# Patient Record
Sex: FEMALE | NOT HISPANIC OR LATINO | URBAN - METROPOLITAN AREA
[De-identification: names, ages, dates, MRNs, and addresses within clinical notes are randomized per-mention and may not be internally consistent; named-entity substitution may affect disease eponyms.]

---

## 2017-09-18 ENCOUNTER — INPATIENT (INPATIENT)
Facility: HOSPITAL | Age: 22
LOS: 7 days | Discharge: HOME | DRG: 871 | End: 2017-09-26
Attending: HOSPITALIST | Admitting: HOSPITALIST
Payer: COMMERCIAL

## 2017-09-18 VITALS
OXYGEN SATURATION: 98 % | WEIGHT: 110.01 LBS | TEMPERATURE: 101 F | SYSTOLIC BLOOD PRESSURE: 110 MMHG | DIASTOLIC BLOOD PRESSURE: 75 MMHG | HEART RATE: 128 BPM

## 2017-09-18 LAB
ALBUMIN SERPL ELPH-MCNC: 4.4 G/DL — SIGNIFICANT CHANGE UP (ref 3.3–5)
ALP SERPL-CCNC: 34 U/L — LOW (ref 40–120)
ALT FLD-CCNC: 29 U/L RC — SIGNIFICANT CHANGE UP (ref 10–45)
ANION GAP SERPL CALC-SCNC: 17 MMOL/L — SIGNIFICANT CHANGE UP (ref 5–17)
APPEARANCE UR: CLEAR — SIGNIFICANT CHANGE UP
AST SERPL-CCNC: 21 U/L — SIGNIFICANT CHANGE UP (ref 10–40)
BASOPHILS # BLD AUTO: 0 K/UL — SIGNIFICANT CHANGE UP (ref 0–0.2)
BASOPHILS NFR BLD AUTO: 0.1 % — SIGNIFICANT CHANGE UP (ref 0–2)
BILIRUB SERPL-MCNC: 0.4 MG/DL — SIGNIFICANT CHANGE UP (ref 0.2–1.2)
BILIRUB UR-MCNC: NEGATIVE — SIGNIFICANT CHANGE UP
BUN SERPL-MCNC: 8 MG/DL — SIGNIFICANT CHANGE UP (ref 7–23)
CALCIUM SERPL-MCNC: 9.6 MG/DL — SIGNIFICANT CHANGE UP (ref 8.4–10.5)
CHLORIDE SERPL-SCNC: 101 MMOL/L — SIGNIFICANT CHANGE UP (ref 96–108)
CO2 SERPL-SCNC: 21 MMOL/L — LOW (ref 22–31)
COLOR SPEC: SIGNIFICANT CHANGE UP
CREAT SERPL-MCNC: 0.7 MG/DL — SIGNIFICANT CHANGE UP (ref 0.5–1.3)
DIFF PNL FLD: ABNORMAL
EOSINOPHIL # BLD AUTO: 0 K/UL — SIGNIFICANT CHANGE UP (ref 0–0.5)
EOSINOPHIL NFR BLD AUTO: 0 % — SIGNIFICANT CHANGE UP (ref 0–6)
GLUCOSE SERPL-MCNC: 115 MG/DL — HIGH (ref 70–99)
GLUCOSE UR QL: NEGATIVE — SIGNIFICANT CHANGE UP
HCT VFR BLD CALC: 40.9 % — SIGNIFICANT CHANGE UP (ref 34.5–45)
HGB BLD-MCNC: 14 G/DL — SIGNIFICANT CHANGE UP (ref 11.5–15.5)
KETONES UR-MCNC: ABNORMAL
LEUKOCYTE ESTERASE UR-ACNC: NEGATIVE — SIGNIFICANT CHANGE UP
LYMPHOCYTES # BLD AUTO: 1 K/UL — SIGNIFICANT CHANGE UP (ref 1–3.3)
LYMPHOCYTES # BLD AUTO: 6.4 % — LOW (ref 13–44)
MCHC RBC-ENTMCNC: 33.7 PG — SIGNIFICANT CHANGE UP (ref 27–34)
MCHC RBC-ENTMCNC: 34.4 GM/DL — SIGNIFICANT CHANGE UP (ref 32–36)
MCV RBC AUTO: 98.2 FL — SIGNIFICANT CHANGE UP (ref 80–100)
MONOCYTES # BLD AUTO: 1.2 K/UL — HIGH (ref 0–0.9)
MONOCYTES NFR BLD AUTO: 7.2 % — SIGNIFICANT CHANGE UP (ref 2–14)
NEUTROPHILS # BLD AUTO: 13.8 K/UL — HIGH (ref 1.8–7.4)
NEUTROPHILS NFR BLD AUTO: 86.3 % — HIGH (ref 43–77)
NITRITE UR-MCNC: NEGATIVE — SIGNIFICANT CHANGE UP
PH UR: 6.5 — SIGNIFICANT CHANGE UP (ref 5–8)
PLATELET # BLD AUTO: 366 K/UL — SIGNIFICANT CHANGE UP (ref 150–400)
POTASSIUM SERPL-MCNC: 3.9 MMOL/L — SIGNIFICANT CHANGE UP (ref 3.5–5.3)
POTASSIUM SERPL-SCNC: 3.9 MMOL/L — SIGNIFICANT CHANGE UP (ref 3.5–5.3)
PROT SERPL-MCNC: 7.9 G/DL — SIGNIFICANT CHANGE UP (ref 6–8.3)
PROT UR-MCNC: NEGATIVE — SIGNIFICANT CHANGE UP
RAPID RVP RESULT: SIGNIFICANT CHANGE UP
RBC # BLD: 4.16 M/UL — SIGNIFICANT CHANGE UP (ref 3.8–5.2)
RBC # FLD: 11 % — SIGNIFICANT CHANGE UP (ref 10.3–14.5)
SODIUM SERPL-SCNC: 139 MMOL/L — SIGNIFICANT CHANGE UP (ref 135–145)
SP GR SPEC: 1.01 — SIGNIFICANT CHANGE UP (ref 1.01–1.02)
UROBILINOGEN FLD QL: NEGATIVE — SIGNIFICANT CHANGE UP
WBC # BLD: 16 K/UL — HIGH (ref 3.8–10.5)
WBC # FLD AUTO: 16 K/UL — HIGH (ref 3.8–10.5)

## 2017-09-18 PROCEDURE — 62270 DX LMBR SPI PNXR: CPT

## 2017-09-18 PROCEDURE — 99285 EMERGENCY DEPT VISIT HI MDM: CPT | Mod: 25

## 2017-09-18 RX ORDER — KETOROLAC TROMETHAMINE 30 MG/ML
30 SYRINGE (ML) INJECTION ONCE
Qty: 0 | Refills: 0 | Status: DISCONTINUED | OUTPATIENT
Start: 2017-09-18 | End: 2017-09-18

## 2017-09-18 RX ORDER — SODIUM CHLORIDE 9 MG/ML
1000 INJECTION INTRAMUSCULAR; INTRAVENOUS; SUBCUTANEOUS ONCE
Qty: 0 | Refills: 0 | Status: COMPLETED | OUTPATIENT
Start: 2017-09-18 | End: 2017-09-18

## 2017-09-18 RX ADMIN — SODIUM CHLORIDE 1000 MILLILITER(S): 9 INJECTION INTRAMUSCULAR; INTRAVENOUS; SUBCUTANEOUS at 21:56

## 2017-09-18 RX ADMIN — Medication 30 MILLIGRAM(S): at 23:52

## 2017-09-18 NOTE — ED PROVIDER NOTE - PROGRESS NOTE DETAILS
CSF concerning for bacterial meningitis. Patient well appearing. Discussed with patient and family regarding antibiotics. Father concerning about side effects of antibiotics, primarily c.diff. Discussed at length regarding risks/benefits including permanent disability or death from meningitis, patient and family still deciding on antibiotics. patient and family agreeable to antibiotics after speaking to Dr. Haynes. Also consulted neuro given patient and family concerns.

## 2017-09-18 NOTE — ED PROVIDER NOTE - OBJECTIVE STATEMENT
20 yo F with PMH of migraines treated with Excedrin as needed presenting with 1 day of occipital throbbing HA different from migraines in past and subjective fever. Also complaining of bilateral neck pain and lower back pain. Took ibuprofen at 5 pm today with no relief. Went to urgent care where she was given Tylenol around 7 pm with some improvement in symptoms, but sent here for concern for meningitis. Endorses minimal photophobia. Able to range neck but endorses pain with movement. 20 yo F with PMH of migraines treated with Excedrin as needed presenting with 1 day of occipital throbbing HA different from migraines in past and subjective fever (did not take temp at home). MONTES DE OCA started last night with gradual worsening throughout today. Also complaining of bilateral neck pain and lower back pain. Took ibuprofen at 5 pm today with no relief. Went to urgent care where she was given Tylenol around 7 pm for fever 102 with some improvement in symptoms, but sent here for concern for meningitis. Endorses minimal photophobia. Able to range neck but endorses pain with movement. No vision changes, nasal congestion, ear pain, sore throat, cough, CP, SOB, abd pain, N/V/D, dysuria, rashes, joint pain. LMP 2 weeks ago.

## 2017-09-18 NOTE — ED ADULT NURSE NOTE - OBJECTIVE STATEMENT
22 yo female presents to the ED from home c/o HA, stiff neck and fever starting last night. patient states she woke up with stiff neck this AM and has been constant since with HA at the sides and back of head 5/10 satinet has full ROM of neck. she states she went to [facility] today with fever of 102 and was given 2 tylenol. patient 99.4 temp orally in ED, VK=203. patient denies chest pain, SOB, N/V/D, dizziness cough. recent travel to [place] and [place] 2 weeks ago. MD at the bedside. patient AAOx4. lung sounds clear bilaterally. cap refill <3sec.

## 2017-09-18 NOTE — ED ADULT NURSE REASSESSMENT NOTE - NS ED NURSE REASSESS COMMENT FT1
patient had LP procedure performed and tolerated well. patient laying flat with no c/o at this time.

## 2017-09-18 NOTE — ED PROVIDER NOTE - MUSCULOSKELETAL NECK EXAM
PAIN ON MOVEMENT/bilateral cervical paraspinal tenderness to palpation, no midline tenderness, paraspinal pain with ROM of neck but full ROM

## 2017-09-18 NOTE — ED PROVIDER NOTE - MEDICAL DECISION MAKING DETAILS
MD Benjie,Attending: pt seen and examined. agree with above HPI/ROS/PE. headache unlike her usual "migraines" , and fever since last might. Also c/o stiff neck with pain on full flexion. No other localizing infectious sxs and o/wise well. No recent international travel. N rash.  LP required to r/o early bacterial meningitis. Full bloods with cultures.

## 2017-09-18 NOTE — ED PROCEDURE NOTE - PROCEDURE ADDITIONAL DETAILS
First attempted in lateral recumbent, unable to obtain CSF. Patient then repositioned to sitting and CSF obtained

## 2017-09-19 DIAGNOSIS — G03.9 MENINGITIS, UNSPECIFIED: ICD-10-CM

## 2017-09-19 DIAGNOSIS — R82.4 ACETONURIA: ICD-10-CM

## 2017-09-19 DIAGNOSIS — G43.909 MIGRAINE, UNSPECIFIED, NOT INTRACTABLE, WITHOUT STATUS MIGRAINOSUS: ICD-10-CM

## 2017-09-19 DIAGNOSIS — Z29.9 ENCOUNTER FOR PROPHYLACTIC MEASURES, UNSPECIFIED: ICD-10-CM

## 2017-09-19 DIAGNOSIS — A41.9 SEPSIS, UNSPECIFIED ORGANISM: ICD-10-CM

## 2017-09-19 LAB
APPEARANCE CSF: CLEAR — SIGNIFICANT CHANGE UP
BACTERIAL AG PNL SER: 1 % — SIGNIFICANT CHANGE UP
COLOR CSF: SIGNIFICANT CHANGE UP
CSF PCR RESULT: SIGNIFICANT CHANGE UP
CULTURE RESULTS: SIGNIFICANT CHANGE UP
GLUCOSE CSF-MCNC: 61 MG/DL — SIGNIFICANT CHANGE UP (ref 40–70)
GRAM STN FLD: SIGNIFICANT CHANGE UP
LABORATORY COMMENT REPORT: SIGNIFICANT CHANGE UP
LYMPHOCYTES # CSF: 47 % — SIGNIFICANT CHANGE UP (ref 40–80)
MONOS+MACROS NFR CSF: 6 % — LOW (ref 15–45)
NEUTROPHILS # CSF: 46 % — HIGH (ref 0–6)
NRBC NFR CSF: 810 /UL — HIGH (ref 0–5)
PROT CSF-MCNC: 81 MG/DL — HIGH (ref 15–45)
RBC # CSF: 3 /UL — HIGH (ref 0–0)
SOURCE HSV 1/2: SIGNIFICANT CHANGE UP
SPECIMEN SOURCE: SIGNIFICANT CHANGE UP
SPECIMEN SOURCE: SIGNIFICANT CHANGE UP
TUBE TYPE: SIGNIFICANT CHANGE UP

## 2017-09-19 PROCEDURE — 99254 IP/OBS CNSLTJ NEW/EST MOD 60: CPT | Mod: GC

## 2017-09-19 PROCEDURE — 99223 1ST HOSP IP/OBS HIGH 75: CPT | Mod: GC

## 2017-09-19 RX ORDER — SODIUM CHLORIDE 9 MG/ML
1000 INJECTION INTRAMUSCULAR; INTRAVENOUS; SUBCUTANEOUS
Qty: 0 | Refills: 0 | Status: DISCONTINUED | OUTPATIENT
Start: 2017-09-19 | End: 2017-09-26

## 2017-09-19 RX ORDER — AMPICILLIN TRIHYDRATE 250 MG
2 CAPSULE ORAL ONCE
Qty: 0 | Refills: 0 | Status: COMPLETED | OUTPATIENT
Start: 2017-09-19 | End: 2017-09-20

## 2017-09-19 RX ORDER — AMPICILLIN TRIHYDRATE 250 MG
CAPSULE ORAL
Qty: 0 | Refills: 0 | Status: DISCONTINUED | OUTPATIENT
Start: 2017-09-20 | End: 2017-09-20

## 2017-09-19 RX ORDER — VANCOMYCIN HCL 1 G
1000 VIAL (EA) INTRAVENOUS ONCE
Qty: 0 | Refills: 0 | Status: COMPLETED | OUTPATIENT
Start: 2017-09-19 | End: 2017-09-19

## 2017-09-19 RX ORDER — ACYCLOVIR SODIUM 500 MG
500 VIAL (EA) INTRAVENOUS ONCE
Qty: 0 | Refills: 0 | Status: COMPLETED | OUTPATIENT
Start: 2017-09-19 | End: 2017-09-19

## 2017-09-19 RX ORDER — KETOROLAC TROMETHAMINE 30 MG/ML
30 SYRINGE (ML) INJECTION EVERY 6 HOURS
Qty: 0 | Refills: 0 | Status: DISCONTINUED | OUTPATIENT
Start: 2017-09-19 | End: 2017-09-19

## 2017-09-19 RX ORDER — ACETAMINOPHEN 500 MG
1000 TABLET ORAL ONCE
Qty: 0 | Refills: 0 | Status: COMPLETED | OUTPATIENT
Start: 2017-09-19 | End: 2017-09-19

## 2017-09-19 RX ORDER — AMPICILLIN TRIHYDRATE 250 MG
2 CAPSULE ORAL EVERY 6 HOURS
Qty: 0 | Refills: 0 | Status: DISCONTINUED | OUTPATIENT
Start: 2017-09-20 | End: 2017-09-20

## 2017-09-19 RX ORDER — CEFTRIAXONE 500 MG/1
2 INJECTION, POWDER, FOR SOLUTION INTRAMUSCULAR; INTRAVENOUS ONCE
Qty: 0 | Refills: 0 | Status: COMPLETED | OUTPATIENT
Start: 2017-09-19 | End: 2017-09-19

## 2017-09-19 RX ORDER — ACYCLOVIR SODIUM 500 MG
500 VIAL (EA) INTRAVENOUS EVERY 8 HOURS
Qty: 0 | Refills: 0 | Status: DISCONTINUED | OUTPATIENT
Start: 2017-09-19 | End: 2017-09-20

## 2017-09-19 RX ORDER — ENOXAPARIN SODIUM 100 MG/ML
40 INJECTION SUBCUTANEOUS EVERY 24 HOURS
Qty: 0 | Refills: 0 | Status: DISCONTINUED | OUTPATIENT
Start: 2017-09-19 | End: 2017-09-23

## 2017-09-19 RX ORDER — ONDANSETRON 8 MG/1
4 TABLET, FILM COATED ORAL ONCE
Qty: 0 | Refills: 0 | Status: COMPLETED | OUTPATIENT
Start: 2017-09-19 | End: 2017-09-19

## 2017-09-19 RX ORDER — SODIUM CHLORIDE 9 MG/ML
1000 INJECTION INTRAMUSCULAR; INTRAVENOUS; SUBCUTANEOUS ONCE
Qty: 0 | Refills: 0 | Status: DISCONTINUED | OUTPATIENT
Start: 2017-09-19 | End: 2017-09-19

## 2017-09-19 RX ORDER — ONDANSETRON 8 MG/1
4 TABLET, FILM COATED ORAL EVERY 8 HOURS
Qty: 0 | Refills: 0 | Status: DISCONTINUED | OUTPATIENT
Start: 2017-09-19 | End: 2017-09-26

## 2017-09-19 RX ORDER — SODIUM CHLORIDE 9 MG/ML
1000 INJECTION INTRAMUSCULAR; INTRAVENOUS; SUBCUTANEOUS
Qty: 0 | Refills: 0 | Status: DISCONTINUED | OUTPATIENT
Start: 2017-09-19 | End: 2017-09-19

## 2017-09-19 RX ORDER — ACETAMINOPHEN 500 MG
650 TABLET ORAL EVERY 6 HOURS
Qty: 0 | Refills: 0 | Status: DISCONTINUED | OUTPATIENT
Start: 2017-09-19 | End: 2017-09-26

## 2017-09-19 RX ORDER — LACTOBACILLUS ACIDOPHILUS 100MM CELL
1 CAPSULE ORAL ONCE
Qty: 0 | Refills: 0 | Status: COMPLETED | OUTPATIENT
Start: 2017-09-19 | End: 2017-09-19

## 2017-09-19 RX ORDER — KETOROLAC TROMETHAMINE 30 MG/ML
15 SYRINGE (ML) INJECTION EVERY 6 HOURS
Qty: 0 | Refills: 0 | Status: DISCONTINUED | OUTPATIENT
Start: 2017-09-19 | End: 2017-09-21

## 2017-09-19 RX ORDER — CEFTRIAXONE 500 MG/1
2 INJECTION, POWDER, FOR SOLUTION INTRAMUSCULAR; INTRAVENOUS EVERY 12 HOURS
Qty: 0 | Refills: 0 | Status: DISCONTINUED | OUTPATIENT
Start: 2017-09-19 | End: 2017-09-20

## 2017-09-19 RX ORDER — VANCOMYCIN HCL 1 G
1000 VIAL (EA) INTRAVENOUS EVERY 12 HOURS
Qty: 0 | Refills: 0 | Status: DISCONTINUED | OUTPATIENT
Start: 2017-09-19 | End: 2017-09-20

## 2017-09-19 RX ADMIN — SODIUM CHLORIDE 125 MILLILITER(S): 9 INJECTION INTRAMUSCULAR; INTRAVENOUS; SUBCUTANEOUS at 14:34

## 2017-09-19 RX ADMIN — Medication 250 MILLIGRAM(S): at 14:34

## 2017-09-19 RX ADMIN — Medication 650 MILLIGRAM(S): at 22:02

## 2017-09-19 RX ADMIN — CEFTRIAXONE 100 GRAM(S): 500 INJECTION, POWDER, FOR SOLUTION INTRAMUSCULAR; INTRAVENOUS at 23:18

## 2017-09-19 RX ADMIN — Medication 30 MILLIGRAM(S): at 02:02

## 2017-09-19 RX ADMIN — Medication 110 MILLIGRAM(S): at 17:45

## 2017-09-19 RX ADMIN — Medication 15 MILLIGRAM(S): at 17:46

## 2017-09-19 RX ADMIN — Medication 1 TABLET(S): at 02:08

## 2017-09-19 RX ADMIN — CEFTRIAXONE 100 GRAM(S): 500 INJECTION, POWDER, FOR SOLUTION INTRAMUSCULAR; INTRAVENOUS at 01:37

## 2017-09-19 RX ADMIN — Medication 650 MILLIGRAM(S): at 15:12

## 2017-09-19 RX ADMIN — SODIUM CHLORIDE 125 MILLILITER(S): 9 INJECTION INTRAMUSCULAR; INTRAVENOUS; SUBCUTANEOUS at 17:46

## 2017-09-19 RX ADMIN — Medication 650 MILLIGRAM(S): at 14:42

## 2017-09-19 RX ADMIN — Medication 15 MILLIGRAM(S): at 18:16

## 2017-09-19 RX ADMIN — Medication 110 MILLIGRAM(S): at 23:18

## 2017-09-19 RX ADMIN — Medication 400 MILLIGRAM(S): at 04:54

## 2017-09-19 RX ADMIN — Medication 1000 MILLIGRAM(S): at 05:38

## 2017-09-19 RX ADMIN — Medication 110 MILLIGRAM(S): at 06:51

## 2017-09-19 RX ADMIN — SODIUM CHLORIDE 125 MILLILITER(S): 9 INJECTION INTRAMUSCULAR; INTRAVENOUS; SUBCUTANEOUS at 05:38

## 2017-09-19 RX ADMIN — Medication 650 MILLIGRAM(S): at 10:15

## 2017-09-19 RX ADMIN — CEFTRIAXONE 100 GRAM(S): 500 INJECTION, POWDER, FOR SOLUTION INTRAMUSCULAR; INTRAVENOUS at 12:59

## 2017-09-19 RX ADMIN — ONDANSETRON 4 MILLIGRAM(S): 8 TABLET, FILM COATED ORAL at 04:54

## 2017-09-19 RX ADMIN — ONDANSETRON 4 MILLIGRAM(S): 8 TABLET, FILM COATED ORAL at 22:14

## 2017-09-19 RX ADMIN — ONDANSETRON 4 MILLIGRAM(S): 8 TABLET, FILM COATED ORAL at 17:54

## 2017-09-19 RX ADMIN — Medication 250 MILLIGRAM(S): at 02:08

## 2017-09-19 NOTE — PROGRESS NOTE ADULT - ASSESSMENT
22 yo F with PMHx of migraines presenting headache and Fever found to be septic with concern for viral vs. bacterial meningitis s/p LP

## 2017-09-19 NOTE — CONSULT NOTE ADULT - ASSESSMENT
Pt is a 22 yo F with a PMH of Migraines treated with Excedrin as needed who presented with 1 day of occipital throbbing HA (different from past migraines) accompanied with subjective fever and neck stiffness. Pt did not let me examine her, but exam performed by ED is non-focal, however symptoms, labs and fever very suggestive of Bacterial vs. Viral Meningitis.    Recommendations:  - C/W Vancomycin and Ceftriaxone to cover for bacterial meningitis  - Start Acyclovir to cover for viral meningitis  - Tylenol and Toradol PRN fever and pain/HA  - Discussed risks/benefits of treatment with pt and mother, they appear to understand and are on board

## 2017-09-19 NOTE — CONSULT NOTE ADULT - SUBJECTIVE AND OBJECTIVE BOX
HPI:  Pt is a 20 yo F with a PMH of Migraines treated with Excedrin as needed who presented with 1 day of occipital throbbing HA (different from past migraines) accompanied with subjective fever and neck stiffness. HA started last night with gradual worsening, bilateral neck pain/lower back pain and stiffness started earlier today. Took ibuprofen at 5 pm today with no relief. Went to urgent care where she was given Tylenol around 7 pm for fever 102 with some improvement in symptoms, but sent here for concern for meningitis. Endorses photophobia. Able to move neck through range of motion but endorses pain with movement. No vision changes, focal weakness, numbness/tingling, cough, CP, SOB, abd pain, N/V/D, dysuria, rashes, joint pain. LMP 2 weeks ago.      MEDICATIONS  (STANDING):  vancomycin  IVPB 1000 milliGRAM(s) IV Intermittent once  lactobacillus acidophilus 1 Tablet(s) Oral Once    MEDICATIONS  (PRN):    PAST MEDICAL & SURGICAL HISTORY:  Migraine  No significant past surgical history    FAMILY HISTORY:    Allergies    No Known Allergies    Intolerances        SHx - No smoking, No ETOH, No drug abuse      Review of Systems:  See HPI.      Vital Signs Last 24 Hrs  T(C): 37 (19 Sep 2017 00:39), Max: 38.4 (18 Sep 2017 20:11)  T(F): 98.6 (19 Sep 2017 00:39), Max: 101.1 (18 Sep 2017 20:11)  HR: 90 (19 Sep 2017 00:39) (90 - 128)  BP: 116/89 (19 Sep 2017 00:39) (110/75 - 133/78)  BP(mean): --  RR: 18 (19 Sep 2017 00:39) (18 - 18)  SpO2: 100% (19 Sep 2017 00:39) (98% - 100%)      Physical Exam:  Pt did not want to be examined due to multiple examinations today, as well as soreness at the LP site.      09-18    139  |  101  |  8   ----------------------------<  115<H>  3.9   |  21<L>  |  0.70    Ca    9.6      18 Sep 2017 22:06    TPro  7.9  /  Alb  4.4  /  TBili  0.4  /  DBili  x   /  AST  21  /  ALT  29  /  AlkPhos  34<L>  09-18                            14.0   16.0  )-----------( 366      ( 18 Sep 2017 22:06 )             40.9
HPI:  22yo F with PMH of Migraines (attacks 2-3 x year, respond to excedrin) presenting HA and fever x1 day. Patient states MONTES DE OCA started last night and has progressively worsened over time. HA described as posterior, throbbing; pt states this HA is not typical of her migraines. She endorses subjective fevers but did not take her temp. HA and fevers are associated with neck and back pain. Pt endorses mild photophobia but denies any other visual changes. She took motrin  without any relief. Was evaluated at an Urgent Care, found to have a fever and was advised to present to the ED. Denies URI symptoms, CP, SOB, N/V, dysuria, genital lesions, rash, numbness/tingling, or joint pain. 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 She reports recent travel, returned from [place] two weeks ago after a 2 week stay but did not have any symptoms there. Patient denies any other camping or bug bites.  Lines in [place]. She denies substance abuse or new sexual encounters/sick contacts. Reports a "stomach virus" about a week that self resolved within 24h. Did not take any antibiotics before arriving in ED.    In the ED, pt was febrile to 101.1, /75, tachycardic to 128, and satting well on RA. She underwent an LP after 2 attempts and received Vanc/CTX, Toradol, and 1L bolus. (19 Sep 2017 03:54)  Currently complaining of mild headache, still has neck stiffness, photophobia and back pain after the LP.      PAST MEDICAL & SURGICAL HISTORY:  Migraine  No significant past surgical history      Allergies  No Known Allergies        ANTIMICROBIALS:  cefTRIAXone   IVPB 2 every 12 hours  vancomycin  IVPB 1000 every 12 hours  acyclovir IVPB 500 every 8 hours      OTHER MEDS: MEDICATIONS  (STANDING):  enoxaparin Injectable 40 every 24 hours  acetaminophen   Tablet 650 every 6 hours PRN  ondansetron Injectable 4 every 8 hours PRN  acetaminophen   Tablet. 650 every 6 hours PRN      SOCIAL HISTORY:  [ ] etoh [ ] tobacco [ ] former smoker [ ] IVDU    FAMILY HISTORY:  No significant family history      REVIEW OF SYSTEMS  [  ] ROS unobtainable because:    [  x] All other systems negative except as noted below:	    Constitutional:  [x ] fever [ ] weight loss  Skin:  [ ] rash [ ] phlebitis	  Eyes: [ ] icterus [ ] inflammation	  ENMT: [ ] discharge [ ] thrush [ ] ulcers [ ] exudates  Respiratory: [ ] dyspnea [ ] hemoptysis [ ] cough [ ] sputum	  Cardiovascular:  [ ] chest pain [ ] palpitations [ ] edema	  Gastrointestinal:  [ ] nausea [ ] vomiting [ ] diarrhea [ ] constipation [ ] pain	  Genitourinary:  [ ] dysuria [ ] frequency [ ] hematuria [ ] discharge [ ] flank pain  Musculoskeletal:  [ ] myalgias [ ] arthralgias [ ] arthritis	  Neurological:  [x ] headache [ ] seizures	neck pain+, photophobia +  Psychiatric:  [ ] anxiety [ ] depression	  Hematology/Lymphatics:  [ ] lymphadenopathy  Endocrine:  [ ] adrenal [ ] thyroid  Allergic/Immunologic:	 [ ] transplant [ ] seasonal    Vital Signs Last 24 Hrs  T(F): 99.4 (17 @ 10:13), Max: 101.1 (17 @ 20:11)    Vital Signs Last 24 Hrs  HR: 111 (17 @ 07:31) (90 - 128)  BP: 111/68 (17 @ 07:31) (110/75 - 133/78)  RR: 18 (17 @ 07:31)  SpO2: 97% (17 @ 07:31) (97% - 100%)  Wt(kg): --    PHYSICAL EXAM:  General: non-toxic  HEAD/EYES: anicteric, PERRL  ENT:  + neck rigidity, limited ROM  Cardiovascular:   S1, S2  Respiratory:  clear bilaterally  GI:  soft, non-tender, normal bowel sounds  :  no CVA tenderness   Musculoskeletal:  no synovitis  Neurologic:  grossly non-focal  Skin:  no rash  Lymph: no lymphadenopathy  Psychiatric:  appropriate affect  Vascular:  no phlebitis                                14.0   16.0  )-----------( 366      ( 18 Sep 2017 22:06 )             40.9       -    139  |  101  |  8   ----------------------------<  115<H>  3.9   |  21<L>  |  0.70    Ca    9.6      18 Sep 2017 22:06    TPro  7.9  /  Alb  4.4  /  TBili  0.4  /  DBili  x   /  AST  21  /  ALT  29  /  AlkPhos  34<L>        Urinalysis Basic - ( 18 Sep 2017 22:06 )    Color: x / Appearance: Clear / S.010 / pH: x  Gluc: x / Ketone: Moderate  / Bili: Negative / Urobili: Negative   Blood: x / Protein: Negative / Nitrite: Negative   Leuk Esterase: Negative / RBC: 2-5 /HPF / WBC 2-5 /HPF   Sq Epi: x / Non Sq Epi: x / Bacteria: Few /HPF        MICROBIOLOGY:  Cerebrospinal Fluid Cell Count-1 (17 @ 23:38)    Total Nucleated Cell Count, CSF: 810 /uL    CSF Color: No Color    CSF Appearance: Clear    CSF Lymphocytes: 47 %    CSF Monocytes/Macrophages: 6 %    CSF Segmented Neutrophils: 46 %    Atypical Lymphocytes - CSF: 1 %    Protein, CSF (17 @ 23:32)    Protein, CSF: 81 mg/dL    Glucose, CSF (17 @ 23:32)    Glucose, CSF: 61 mg/dL    Lactate Dehydrogenase, CSF (17 @ 23:32)    Lactate Dehydrogenase, CSF: 31: Reference Ranges have NOT been established for CSF LDH.  The  has not determined the efficacy of this test when  performed on CSF specimens. The performance characteristics of this test  were determined by [facility]. U/L      Culture - Fungal, CSF (17 @ 02:28)    Specimen Source: .CSF CSF    Culture Results:   Testing in progress    Culture - CSF with Gram Stain . (17 @ 02:28)    Gram Stain:   polymorphonuclear leukocytes per low power field  No organisms seen per oil power field  by cytocentrifuge    Specimen Source: .CSF CSF    Urine Microscopic-Add On (NC) (17 @ 22:06)    Red Blood Cell - Urine: 2-5 /HPF    White Blood Cell - Urine: 2-5 /HPF    Bacteria: Few /HPF    Epithelial Cells: Occasional /HPF  Rapid Respiratory Viral Panel (17 @ 22:06)    Rapid RVP Result: NotDetec: The [employer] RVP Rapid uses polymerase chain reaction (PCR) and melt  curve analysis to screen for adenovirus; coronavirus HKU1, NL63, 229E,  OC43; human metapneumovirus (hMPV); human enterovirus/rhinovirus  (Entero/RV); influenza A; influenza A/H1;NotDetec: influenza A/H3; influenza  A/H1-2009; influenza B; parainfluenza viruses 1, 2, 3, 4; respiratory  syncytial virus; Bordetella pertussis; Mycoplasma pneumoniae; and  Chlamydophila pneumoniae.            v    Rapid RVP Result: Gabe ( @ 22:06)          RADIOLOGY:

## 2017-09-19 NOTE — PROGRESS NOTE ADULT - PROBLEM SELECTOR PLAN 1
-CSF showing normal Glu and elevated Pro more consistent with viral meningitis; however, gram stain with PMNs concerning for bacterial   -If viral likely 2/2 to Enterovirus, f/u HSV PCR but in immunocompetent pts HSV2 more likely over HSV1 but pt denies any genital lesions, UTD on vaccines  -c/w empiric bacterial meningitis coverage for now: Vanc/CTX, continue Acyclovir for viral meningitis  -ID Consulted, will f/u recs   -no seizures, neuro deficit to suggest encephalitis  -droplet precautions  -appreciate Neuro recs

## 2017-09-19 NOTE — PROGRESS NOTE ADULT - ATTENDING COMMENTS
Likely aseptic meningitis – s/p LP await CSF culture in the meantime continue Ceftriaxone 2g q 12,  Vancomycin 1g q 12, Acyclovir 500mg q 8  Will d/c acyclovir if  herpes CSF PCR is negative.   Monitor WBC , IVF , Pain for headache

## 2017-09-19 NOTE — H&P ADULT - ATTENDING COMMENTS
agree with excellent PGY note with the following additions:  This is a 21 year old woman with history of migraine presenting with HA, fevers, neck stiffness, CSF c/w viral>>bacterial meningitis.  --continue vancomycin/ceftriaxone pending culture results  --continue acyclovir  --toradol, zofran PRN  --f/u viral studies including [place], VDRL, herpes  --droplet precautions  --NS@125  --appreciate neuro recs

## 2017-09-19 NOTE — PROGRESS NOTE ADULT - PROBLEM SELECTOR PLAN 2
-upon admission, patient tachycardic and febrile, concern for meningitis s/p LP  -F/u blood cx, CSF c/x  -If CSF Cx negative, will d/c abx   -RVP negative, UA not suggestive of infection

## 2017-09-19 NOTE — CONSULT NOTE ADULT - ATTENDING COMMENTS
22yo F with PMH of Migraines (attacks 2-3 x year, respond to excedrin) presenting HA and fever x1 day. Patient with HA, neck stiffness, photophobia. CSF positive with high cells, mixed neutrophils/lymphs. Protein/glucose not particularly abnormal. Initial culture negative. Suspected aseptic meningitis over bacterial based on presentation but would continue for now. Patient otherwise feels well.  - Ceftriaxone 2g q 12  - Vancomycin 1g q 12  - Acyclovir 500mg q 8 (Please continue IVF while on this medication)  - F/U culture, follow up CSF PCR Panel (please add on)  - F/U pending LP tests      Mika Manuel MD  Pager [phone]  After 5pm and on weekends call [phone]

## 2017-09-19 NOTE — H&P ADULT - ADDITIONAL PE
T(C): 37 (19 Sep 2017 00:39), Max: 38.4 (18 Sep 2017 20:11) T(F): 98.6 (19 Sep 2017 00:39), Max: 101.1 (18 Sep 2017 20:11)  HR: 90 (19 Sep 2017 00:39) (90 - 128) BP: 116/89 (19 Sep 2017 00:39) (110/75 - 133/78)  RR: 18 (19 Sep 2017 00:39) (18 - 18) SpO2: 100% (19 Sep 2017 00:39) (98% - 100%)

## 2017-09-19 NOTE — CONSULT NOTE ADULT - ASSESSMENT
22 yo F with PMH of Migraines (attacks 2-3 x year, respond to excedrin) presenting with headaches, neck stiffness, photophobia, fever x 2 days. Found to have fever 101 F, leukocytosis of 16 with 86% neutrophils. s/p LP 09/18 with 690 nucleated cells, Glucose 61-normal, elevated protein-81, CSF differential 46% neutrophils/47%lymphocytes. Initial gram stain + for polymorphonuclear leukocytes. Admitted for meningitis-suspect viral etiology    f/u CSF PCR panel, [place] virus CSF IgM, HSV 1/2 CSF PCR, CSF AFB and fungal cultures, f/u blood cultures x 2  c/w ceftriaxone 2 g iv daily, vancomycin 1 g q12h, acyclovir 500 mg iv q8h for now  Will discuss with attending 20 yo F with PMH of Migraines (attacks 2-3 x year, respond to excedrin) presenting with headaches, neck stiffness, photophobia, fever x 2 days. Found to have fever 101 F, leukocytosis of 16 with 86% neutrophils. s/p LP 09/18 with 690 nucleated cells, Glucose 61-normal, elevated protein-81, CSF differential 46% neutrophils/47%lymphocytes. Initial gram stain + for polymorphonuclear leukocytes. Admitted for meningitis-suspect viral etiology    f/u CSF PCR panel, [place] virus CSF IgM, HSV 1/2 CSF PCR, CSF AFB and fungal cultures, f/u blood cultures x 2  c/w ceftriaxone 2 g iv daily, vancomycin 1 g q12h, acyclovir 500 mg iv q8h (10 mg/kg) for now  Droplet isolation precautions for 24h while on antibiotics  Will discuss with attending 22 yo F with PMH of Migraines (attacks 2-3 x year, respond to excedrin) presenting with headaches, neck stiffness, photophobia, fever x 2 days. Found to have fever 101 F, leukocytosis of 16 with 86% neutrophils. s/p LP 09/18 with 690 nucleated cells, Glucose 61-normal, elevated protein-81, CSF differential 46% neutrophils/47%lymphocytes. Initial gram stain + for polymorphonuclear leukocytes. Admitted for meningitis-suspect viral etiology    f/u CSF PCR panel, [place] virus CSF IgM, HSV 1/2 CSF PCR, CSF AFB and fungal cultures, f/u blood cultures x 2  c/w ceftriaxone 2 g iv 12, vancomycin 1 g q12h, acyclovir 500 mg iv q8h (10 mg/kg) for now  Droplet isolation precautions for 24h while on antibiotics  Will discuss with attending

## 2017-09-19 NOTE — H&P ADULT - PROBLEM SELECTOR PLAN 3
-likely starvation ketosis in setting of decreased PO intake during acute illness  -encourage PO as tolerated

## 2017-09-19 NOTE — H&P ADULT - PROBLEM SELECTOR PLAN 1
-CSF showing normal Glu and elevated Pro more consistent with viral meningitis; Gram Stain w/o organisms but many WBC's  -c/w Acyclovir for viral meningitis; if viral more likely an Entero virus, f/u HSV PCR but in immunocompetent pts HSV2 more liekly over HSV1 but pt denies any genital lesions, UTD on vaccines  -c/w empiric bacterial meningitis coverage for now: Vanc/CTX -CSF showing normal Glu and elevated Pro more consistent with viral meningitis; Gram Stain w/o organisms but many WBC's  -c/w Acyclovir for viral meningitis; if viral more likely an Entero virus, f/u HSV PCR but in immunocompetent pts HSV2 more likely over HSV1 but pt denies any genital lesions, UTD on vaccines  -c/w empiric bacterial meningitis coverage for now: Vanc/CTX  -droplet precautions  -appreciate Neuro recs -CSF showing normal Glu and elevated Pro more consistent with viral meningitis; Gram Stain w/o organisms but many WBC's  -c/w Acyclovir for viral meningitis; if viral more likely an Entero virus, f/u HSV PCR but in immunocompetent pts HSV2 more likely over HSV1 but pt denies any genital lesions, UTD on vaccines  -c/w empiric bacterial meningitis coverage for now: Vanc/CTX  -no seizures, neuro deficit to suggest encephalitis  -droplet precautions  -appreciate Neuro recs

## 2017-09-19 NOTE — H&P ADULT - PROBLEM SELECTOR PLAN 2
-concern for meningitis  -f/u pending Bld Cx's, CSF Cx's  -c/w Meningitis coverage  -RVP negative, UA not suggestive of infection

## 2017-09-19 NOTE — H&P ADULT - NSHPSOCIALHISTORY_GEN_ALL_CORE
Works for [employer]  Denies smoking/substance abuse  Endorses social EtOH use  Denies other risky behaviors

## 2017-09-19 NOTE — H&P ADULT - NSHPLABSRESULTS_GEN_ALL_CORE
LABS  Personally Read and Interpreted                          14.0   16.0  )-----------( 366      ( 18 Sep 2017 22:06 )             40.9         139  |  101  |  8   ----------------------------<  115<H>  3.9   |  21<L>  |  0.70    Ca    9.6      18 Sep 2017 22:06    TPro  7.9  /  Alb  4.4  /  TBili  0.4  /  DBili  x   /  AST  21  /  ALT  29  /  AlkPhos  34<L>        Urinalysis Basic - ( 18 Sep 2017 22:06 )  Color: x / Appearance: Clear / S.010 / pH: x  Gluc: x / Ketone: Moderate  / Bili: Negative / Urobili: Negative   Blood: x / Protein: Negative / Nitrite: Negative   Leuk Esterase: Negative / RBC: 2-5 /HPF / WBC 2-5 /HPF   Sq Epi: x / Non Sq Epi: x / Bacteria: Few /HPF    CSF Fluid: Glu = 61 Pro = 81; Gram Stain = no organisms seen; Clear fluid; /810 (mixed neutrophils/lymphocytes)    RVP negative Labs, imaging and tracing personally read and Interpreted               14.0   16.0  )-----------( 366      ( 18 Sep 2017 22:06 )             40.9         139  |  101  |  8   ----------------------------<  115<H>  3.9   |  21<L>  |  0.70    Ca    9.6      18 Sep 2017 22:06    TPro  7.9  /  Alb  4.4  /  TBili  0.4  /  DBili  x   /  AST  21  /  ALT  29  /  AlkPhos  34<L>        Urinalysis Basic - ( 18 Sep 2017 22:06 )  Color: x / Appearance: Clear / S.010 / pH: x  Gluc: x / Ketone: Moderate  / Bili: Negative / Urobili: Negative   Blood: x / Protein: Negative / Nitrite: Negative   Leuk Esterase: Negative / RBC: 2-5 /HPF / WBC 2-5 /HPF   Sq Epi: x / Non Sq Epi: x / Bacteria: Few /HPF    CSF Fluid: Glu = 61 Pro = 81; Gram Stain = no organisms seen; Clear fluid; /810 (mixed neutrophils/lymphocytes)    RVP negative

## 2017-09-19 NOTE — H&P ADULT - PROBLEM SELECTOR PLAN 5
-DVT PPX: HSQ until ambulatory  -Childbearing Age: check Urine Pregnancy          Khurram Guy M.D.  Medicine Resident, PGY-2  Pager: [phone] -DVT PPX: Lovenox until ambulatory  -Childbearing Age: check Urine Pregnancy          Khurram Guy M.D.  Medicine Resident, PGY-2  Pager: [phone]

## 2017-09-19 NOTE — H&P ADULT - HISTORY OF PRESENT ILLNESS
20yo F with PMH of Migraines presenting HA and fever x1 day. Patient states MONTES DE OCA started last night and has progressively worsened over time. HA described as posterior, throbbing; pt states this HA is not typical of her migraines. She endorses subjective fevers but did not take her temp. HA and fevers are associated with neck and back pain. Pt endorses mild photophobia but denies any other visual changes. She took motrin last night without any relief. Was evaluate laura an Urgent Care, found to have a fever and was advised to present to the ED. Denies URI symptoms, CP, SOB, N/V, dysuria, genital lesions, rash, numbness/tingling, or joint pain. Pt works for an [employer] firm, not in college. She is UTD on her immunizations. She reports recent travel, returned from [place] two weeks ago after a 2 week stay but did not have any symptoms there. Patient denies any other camping or bug bites. She denies substance abuse or new sexual encounters.    In the ED, pt was febrile to 101.1, /75, tachycardic to 128, and satting well on RA. She underwent an LP and received Vanc/CTX, Toradol, and 1L bolus.

## 2017-09-19 NOTE — PROGRESS NOTE ADULT - PROBLEM SELECTOR PLAN 5
-DVT PPX: Lovenox until ambulatory  -Childbearing Age: check Urine Pregnancy          Khurram Guy M.D.  Medicine Resident, PGY-2  Pager: [phone]

## 2017-09-19 NOTE — H&P ADULT - ASSESSMENT
22yo F with PMH of Migraines presenting HA and Fever found to be septic with concern for meningitis s/p LP.

## 2017-09-19 NOTE — PROGRESS NOTE ADULT - SUBJECTIVE AND OBJECTIVE BOX
Patient is a 21y old  Female who presents with a chief complaint of Neck rigidity, headache, fevers (19 Sep 2017 06:12)      SUBJECTIVE / OVERNIGHT EVENTS: Patient seen and examined at bedside. History obtained from patient and mother at bedside. Patient reports headache, throbbing, posterior to anterior, 10/10 on admission, now 4/10. She endorses nuchal rigidity and heaviness. She denies fevers, shakes, chills. Patient endorses NSAID use 4x/day for the "past couple of days" due to headaches, not alleviated. Patient denied nausea, vomiting, odynophagia, chest pain, cough, dyspnea, abdominal pain, constipation, diarrhea, depression        HPI:  20yo F with PMH of Migraines presenting HA and fever x1 day. Patient states [place] started last night and has progressively worsened over time. HA described as posterior, throbbing; pt states this HA is not typical of her migraines. She endorses subjective fevers but did not take her temp. HA and fevers are associated with neck and back pain. Pt endorses mild photophobia but denies any other visual changes. She took motrin last night without any relief. Was evaluate [facility]ura an [facility], found to have a fever and was advised to present to the ED. Denies URI symptoms, CP, SOB, N/V, dysuria, genital lesions, rash, numbness/tingling, or joint pain. 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 She reports recent travel, returned from [place] two weeks ago after a 2 week stay but did not have any symptoms there. Patient denies any other camping or bug bites. She denies substance abuse or new sexual encounters.    In the ED, pt was febrile to 101.1, /75, tachycardic to 128, and satting well on RA. She underwent an LP and received Vanc/CTX, Toradol, and 1L bolus. (19 Sep 2017 03:54)      MEDICATIONS  (STANDING):  cefTRIAXone   IVPB 2 Gram(s) IV Intermittent every 12 hours  vancomycin  IVPB 1000 milliGRAM(s) IV Intermittent every 12 hours  enoxaparin Injectable 40 milliGRAM(s) SubCutaneous every 24 hours  sodium chloride 0.9%. 1000 milliLiter(s) (125 mL/Hr) IV Continuous <Continuous>  acyclovir IVPB 500 milliGRAM(s) IV Intermittent every 8 hours    MEDICATIONS  (PRN):  acetaminophen   Tablet 650 milliGRAM(s) Oral every 6 hours PRN For Temp greater than 38 C (100.4 F)  ondansetron Injectable 4 milliGRAM(s) IV Push every 8 hours PRN Nausea and/or Vomiting  acetaminophen   Tablet. 650 milliGRAM(s) Oral every 6 hours PRN Mild Pain (1 - 3)      Vital Signs Last 24 Hrs  T(C): 37.4 (19 Sep 2017 10:13), Max: 38.4 (18 Sep 2017 20:11)  T(F): 99.4 (19 Sep 2017 10:13), Max: 101.1 (18 Sep 2017 20:11)  HR: 111 (19 Sep 2017 07:31) (90 - 128)  BP: 111/68 (19 Sep 2017 07:31) (110/75 - 133/78)  BP(mean): --  RR: 18 (19 Sep 2017 07:31) (18 - 20)  SpO2: 97% (19 Sep 2017 07:31) (97% - 100%)  CAPILLARY BLOOD GLUCOSE        I&O's Summary      PHYSICAL EXAM:   GENERAL: young female, in NAD, laying in bed   HEAD:  Atraumatic, Normocephalic  EYES: EOMI, PERRLA, conjunctiva and sclera clear  NECK: Supple, No JVD  CHEST/LUNG: Clear to auscultation bilaterally; No wheeze  HEART: Regular rate and rhythm; No murmurs, rubs, or gallops  ABDOMEN: Soft, Nontender, Nondistended; Bowel sounds present  EXTREMITIES:  2+ Peripheral Pulses, No clubbing, cyanosis, or edema  PSYCH: AAOx3  NEUROLOGY: non-focal, CN II-XII intact   SKIN: No rashes or lesions    LABS:                        14.0   16.0  )-----------( 366      ( 18 Sep 2017 22:06 )             40.9     09-18    139  |  101  |  8   ----------------------------<  115<H>  3.9   |  21<L>  |  0.70    Ca    9.6      18 Sep 2017 22:06    TPro  7.9  /  Alb  4.4  /  TBili  0.4  /  DBili  x   /  AST  21  /  ALT  29  /  AlkPhos  34<L>            Urinalysis Basic - ( 18 Sep 2017 22:06 )    Color: x / Appearance: Clear / S.010 / pH: x  Gluc: x / Ketone: Moderate  / Bili: Negative / Urobili: Negative   Blood: x / Protein: Negative / Nitrite: Negative   Leuk Esterase: Negative / RBC: 2-5 /HPF / WBC 2-5 /HPF   Sq Epi: x / Non Sq Epi: x / Bacteria: Few /HPF      Cerebrospinal Fluid Cell Count-1 (17 @ 23:38)    Total Nucleated Cell Count, CSF: 810 /uL    CSF Color: No Color    CSF Appearance: Clear    CSF Lymphocytes: 47 %    CSF Monocytes/Macrophages: 6 %    CSF Segmented Neutrophils: 46 %    Atypical Lymphocytes - CSF: 1 %    Culture - CSF with Gram Stain . (17 @ 02:28)    Gram Stain:   polymorphonuclear leukocytes per low power field  No organisms seen per oil power field  by cytocentrifuge    Specimen Source: .CSF CSF        Cecile Nunez PGY1  Pager: [phone]

## 2017-09-19 NOTE — PROGRESS NOTE ADULT - SUBJECTIVE AND OBJECTIVE BOX
Neurology Attending  See resident note for details, agree with history, physical exam and plan as outlined. Pt with ha, meningitis. Nonfocal neuro exam    Plan  1. ID eval for meningitis, will defer antibiotics to them  2. F/u csf  3. If change in condition, consider MRI brain with contrast

## 2017-09-20 LAB
ANION GAP SERPL CALC-SCNC: 12 MMOL/L — SIGNIFICANT CHANGE UP (ref 5–17)
B BURGDOR DNA SPEC QL NAA+PROBE: NEGATIVE — SIGNIFICANT CHANGE UP
BUN SERPL-MCNC: 4 MG/DL — LOW (ref 7–23)
CALCIUM SERPL-MCNC: 8.7 MG/DL — SIGNIFICANT CHANGE UP (ref 8.4–10.5)
CHLORIDE SERPL-SCNC: 104 MMOL/L — SIGNIFICANT CHANGE UP (ref 96–108)
CO2 SERPL-SCNC: 21 MMOL/L — LOW (ref 22–31)
CREAT SERPL-MCNC: 0.67 MG/DL — SIGNIFICANT CHANGE UP (ref 0.5–1.3)
CULTURE RESULTS: SIGNIFICANT CHANGE UP
GLUCOSE SERPL-MCNC: 110 MG/DL — HIGH (ref 70–99)
HCT VFR BLD CALC: 35.1 % — SIGNIFICANT CHANGE UP (ref 34.5–45)
HGB BLD-MCNC: 11.6 G/DL — SIGNIFICANT CHANGE UP (ref 11.5–15.5)
HIV 1+2 AB+HIV1 P24 AG SERPL QL IA: SIGNIFICANT CHANGE UP
MCHC RBC-ENTMCNC: 31.6 PG — SIGNIFICANT CHANGE UP (ref 27–34)
MCHC RBC-ENTMCNC: 33 GM/DL — SIGNIFICANT CHANGE UP (ref 32–36)
MCV RBC AUTO: 95.6 FL — SIGNIFICANT CHANGE UP (ref 80–100)
PLATELET # BLD AUTO: 294 K/UL — SIGNIFICANT CHANGE UP (ref 150–400)
POTASSIUM SERPL-MCNC: 3.6 MMOL/L — SIGNIFICANT CHANGE UP (ref 3.5–5.3)
POTASSIUM SERPL-SCNC: 3.6 MMOL/L — SIGNIFICANT CHANGE UP (ref 3.5–5.3)
RBC # BLD: 3.67 M/UL — LOW (ref 3.8–5.2)
RBC # FLD: 12.4 % — SIGNIFICANT CHANGE UP (ref 10.3–14.5)
SODIUM SERPL-SCNC: 137 MMOL/L — SIGNIFICANT CHANGE UP (ref 135–145)
SPECIMEN SOURCE: SIGNIFICANT CHANGE UP
VDRL CSF-TITR: NEGATIVE — SIGNIFICANT CHANGE UP
WBC # BLD: 13.32 K/UL — HIGH (ref 3.8–10.5)
WBC # FLD AUTO: 13.32 K/UL — HIGH (ref 3.8–10.5)

## 2017-09-20 PROCEDURE — 99233 SBSQ HOSP IP/OBS HIGH 50: CPT | Mod: GC

## 2017-09-20 PROCEDURE — 99232 SBSQ HOSP IP/OBS MODERATE 35: CPT

## 2017-09-20 RX ORDER — AMPICILLIN TRIHYDRATE 250 MG
2 CAPSULE ORAL EVERY 4 HOURS
Qty: 0 | Refills: 0 | Status: DISCONTINUED | OUTPATIENT
Start: 2017-09-20 | End: 2017-09-26

## 2017-09-20 RX ORDER — KETOROLAC TROMETHAMINE 30 MG/ML
15 SYRINGE (ML) INJECTION ONCE
Qty: 0 | Refills: 0 | Status: DISCONTINUED | OUTPATIENT
Start: 2017-09-20 | End: 2017-09-20

## 2017-09-20 RX ORDER — GENTAMICIN SULFATE 40 MG/ML
VIAL (ML) INJECTION
Qty: 0 | Refills: 0 | Status: DISCONTINUED | OUTPATIENT
Start: 2017-09-20 | End: 2017-09-21

## 2017-09-20 RX ORDER — ACETAMINOPHEN 500 MG
1000 TABLET ORAL ONCE
Qty: 0 | Refills: 0 | Status: DISCONTINUED | OUTPATIENT
Start: 2017-09-20 | End: 2017-09-26

## 2017-09-20 RX ORDER — GENTAMICIN SULFATE 40 MG/ML
80 VIAL (ML) INJECTION ONCE
Qty: 0 | Refills: 0 | Status: COMPLETED | OUTPATIENT
Start: 2017-09-20 | End: 2017-09-20

## 2017-09-20 RX ORDER — GENTAMICIN SULFATE 40 MG/ML
80 VIAL (ML) INJECTION EVERY 8 HOURS
Qty: 0 | Refills: 0 | Status: DISCONTINUED | OUTPATIENT
Start: 2017-09-21 | End: 2017-09-21

## 2017-09-20 RX ADMIN — Medication 216 GRAM(S): at 01:26

## 2017-09-20 RX ADMIN — Medication 216 GRAM(S): at 14:40

## 2017-09-20 RX ADMIN — Medication 650 MILLIGRAM(S): at 05:25

## 2017-09-20 RX ADMIN — Medication 650 MILLIGRAM(S): at 11:18

## 2017-09-20 RX ADMIN — Medication 200 MILLIGRAM(S): at 20:29

## 2017-09-20 RX ADMIN — Medication 15 MILLIGRAM(S): at 05:25

## 2017-09-20 RX ADMIN — Medication 15 MILLIGRAM(S): at 18:01

## 2017-09-20 RX ADMIN — Medication 15 MILLIGRAM(S): at 03:45

## 2017-09-20 RX ADMIN — Medication 110 MILLIGRAM(S): at 05:46

## 2017-09-20 RX ADMIN — Medication 15 MILLIGRAM(S): at 09:55

## 2017-09-20 RX ADMIN — Medication 216 GRAM(S): at 05:46

## 2017-09-20 RX ADMIN — Medication 15 MILLIGRAM(S): at 03:15

## 2017-09-20 RX ADMIN — Medication 15 MILLIGRAM(S): at 06:00

## 2017-09-20 RX ADMIN — Medication 216 GRAM(S): at 17:52

## 2017-09-20 RX ADMIN — Medication 650 MILLIGRAM(S): at 17:52

## 2017-09-20 RX ADMIN — Medication 15 MILLIGRAM(S): at 09:25

## 2017-09-20 RX ADMIN — Medication 250 MILLIGRAM(S): at 01:26

## 2017-09-20 RX ADMIN — Medication 650 MILLIGRAM(S): at 11:48

## 2017-09-20 RX ADMIN — Medication 216 GRAM(S): at 11:17

## 2017-09-20 RX ADMIN — Medication 15 MILLIGRAM(S): at 18:16

## 2017-09-20 RX ADMIN — Medication 216 GRAM(S): at 21:53

## 2017-09-20 NOTE — PROGRESS NOTE ADULT - PROBLEM SELECTOR PLAN 5
-DVT PPX: Lovenox until ambulatory  -Childbearing Age: check Urine Pregnancy          Khurram Guy M.D.  Medicine Resident, PGY-2  Pager: [phone]

## 2017-09-20 NOTE — PROGRESS NOTE ADULT - ASSESSMENT
20 yo F with PMHx of migraines presenting headache and Fever found to be septic with concern for viral vs. bacterial meningitis s/p LP, culture gram positive rods, concern for Listeria, CSF PCR negative

## 2017-09-20 NOTE — PROGRESS NOTE ADULT - SUBJECTIVE AND OBJECTIVE BOX
CC: F/U Meningitis    Saw/spoke to patient. Had fevers, headache overnight. Improved this morning. No focal motor deficits. No other new complaints noted.    Allergies  No Known Allergies    ANTIMICROBIALS:  cefTRIAXone   IVPB 2 every 12 hours  vancomycin  IVPB 1000 every 12 hours  acyclovir IVPB 500 every 8 hours  ampicillin  IVPB 2 every 4 hours    PE:    Vital Signs Last 24 Hrs  T(C): 37.3 (20 Sep 2017 07:15), Max: 39.3 (19 Sep 2017 21:46)  T(F): 99.1 (20 Sep 2017 07:15), Max: 102.7 (19 Sep 2017 21:46)  HR: 114 (20 Sep 2017 07:15) (109 - 121)  BP: 104/63 (20 Sep 2017 07:15) (104/63 - 122/76)  BP(mean): --  RR: 17 (20 Sep 2017 07:15) (17 - 18)  SpO2: 97% (20 Sep 2017 07:15) (97% - 98%)    Gen: AOx3, NAD, non-toxic, pleasant, fatigued  CV: S1+S2 normal, no murmurs, tachycardic  Resp: Clear bilat, no resp distress, no crackles/wheezes  Abd: Soft, nontender, +BS  Ext: No LE edema, no wounds    LABS:                        14.0   16.0  )-----------( 366      ( 18 Sep 2017 22:06 )             40.9     18    139  |  101  |  8   ----------------------------<  115<H>  3.9   |  21<L>  |  0.70    Ca    9.6      18 Sep 2017 22:06    TPro  7.9  /  Alb  4.4  /  TBili  0.4  /  DBili  x   /  AST  21  /  ALT  29  /  AlkPhos  34<L>  18    Urinalysis Basic - ( 18 Sep 2017 22:06 )    Color: x / Appearance: Clear / S.010 / pH: x  Gluc: x / Ketone: Moderate  / Bili: Negative / Urobili: Negative   Blood: x / Protein: Negative / Nitrite: Negative   Leuk Esterase: Negative / RBC: 2-5 /HPF / WBC 2-5 /HPF   Sq Epi: x / Non Sq Epi: x / Bacteria: Few /HPF    MICROBIOLOGY:    9/19 CSF PCR Neg   CSF CX Rare GPR   BCX x2 NGTD    HSV 1/2 PCR: NotDetec ( @ 04:38)  Rapid RVP Result: NotDetehoney ( @ 22:06)    RADIOLOGY:    No new available

## 2017-09-20 NOTE — PROGRESS NOTE ADULT - ATTENDING COMMENTS
Addendum (5:38pm) - Earlier noted that patient culture found to be positive for Listeria. Narrowed coverage to Ampicillin 2g q 4 alone. Note that there is consideration for Gentamicin as synergy agent, although, this needs to be balanced against concern for possibility of ototoxicity and nephrotoxicity in this young patient. Patient overall reassuring for now, ambulatory, no clinical signs deterioration; she is not immunocompromised--for now would treat with Ampicillin without Gent.    If signs of worsening, will need to consider addition of Gent at 3mg/kg q 8 and consider MRI brain. Addendum (5:38pm) - Earlier noted that patient culture found to be positive for Listeria. Narrowed coverage to Ampicillin 2g q 4 alone. IDSA guidelines recommends Gent synergy in setting of meningitis, and note that patient is still spiking fevers within the past hour. I will add Gent for synergy at 5mg/kg (divided into q 8 dosing). Will discuss risks and benefits with patient (including risk of ototoxicity and nephrotoxicity). Will pursue audiology consult tomorrow for baseline. Addendum (5:38pm) - Earlier noted that patient culture found to be positive for Listeria. Narrowed coverage to Ampicillin 2g q 4 alone. IDSA guidelines recommends Gent synergy in setting of meningitis, and note that patient is still spiking fevers, having HA within the past hour. I will add Gent for synergy at 5mg/kg (divided into q 8 dosing). I discussed risks and benefits with patient and mother, risk of nephrotoxicity, ototoxicity (potentially long term); weighed against need to adequately treat this life threatening illness. They communicate understanding. Addendum (5:38pm) - Earlier noted that patient culture found to be positive for Listeria. Narrowed coverage to Ampicillin 2g q 4 alone. IDSA guidelines recommends Gent synergy in setting of meningitis, and note that patient is still spiking fevers, having HA within the past hour. I will add Gent for synergy at 5mg/kg (divided into q 8 dosing). I discussed risks and benefits with patient and mother, risk of nephrotoxicity, ototoxicity (potentially long term); weighed against need to adequately treat this life threatening illness. They communicate understanding and agree with addition gent.

## 2017-09-20 NOTE — PROGRESS NOTE ADULT - SUBJECTIVE AND OBJECTIVE BOX
Patient is a 21y old  Female who presents with a chief complaint of Neck rigidity, headache, fevers (19 Sep 2017 06:12)      SUBJECTIVE / OVERNIGHT EVENTS: Patient seen and examined at bedside.  Patient reports intermittent headache, throbbing, posterior to anterior, 4/10. She endorses stable nuchal rigidity and heaviness. She reports feeling febrile with chills overnight. She denies nausea, vomiting, odynophagia, chest pain, cough, dyspnea, abdominal pain, constipation, diarrhea, depression.      MEDICATIONS  (STANDING):  cefTRIAXone   IVPB 2 Gram(s) IV Intermittent every 12 hours  vancomycin  IVPB 1000 milliGRAM(s) IV Intermittent every 12 hours  enoxaparin Injectable 40 milliGRAM(s) SubCutaneous every 24 hours  sodium chloride 0.9%. 1000 milliLiter(s) (125 mL/Hr) IV Continuous <Continuous>  acyclovir IVPB 500 milliGRAM(s) IV Intermittent every 8 hours    MEDICATIONS  (PRN):  acetaminophen   Tablet 650 milliGRAM(s) Oral every 6 hours PRN For Temp greater than 38 C (100.4 F)  ondansetron Injectable 4 milliGRAM(s) IV Push every 8 hours PRN Nausea and/or Vomiting  acetaminophen   Tablet. 650 milliGRAM(s) Oral every 6 hours PRN Mild Pain (1 - 3)      Vital Signs Last 24 Hrs  T(C): 37.3 (17 @ 07:15), Max: 39.3 (17 @ 21:46)  T(F): 99.1 (17 @ 07:15), Max: 102.7 (17 @ 21:46)  HR: 114 (17 @ 07:15) (109 - 121)  BP: 104/63 (17 @ 07:15) (104/63 - 122/76)  RR: 17 (17 @ 07:15) (17 - 18)  SpO2: 97% (17 @ 07:15) (97% - 98%)        I&O's Summary      PHYSICAL EXAM:   GENERAL: young female, in NAD, laying in bed   HEAD:  Atraumatic, Normocephalic  EYES: EOMI, PERRLA, conjunctiva and sclera clear  NECK: Supple, No JVD; no C-spine TTP, pain on flexion of neck  CHEST/LUNG: Clear to auscultation bilaterally; No wheeze  HEART: Regular rate and rhythm; No murmurs, rubs, or gallops  ABDOMEN: Soft, Nontender, Nondistended; Bowel sounds present  EXTREMITIES:  2+ Peripheral Pulses, No clubbing, cyanosis, or edema  PSYCH: AAOx3  NEUROLOGY: non-focal, CN II-XII intact , +Brudzinski and Kernig sign   SKIN: No rashes or lesions    LABS:               Urinalysis Basic - ( 18 Sep 2017 22:06 )    Color: x / Appearance: Clear / S.010 / pH: x  Gluc: x / Ketone: Moderate  / Bili: Negative / Urobili: Negative   Blood: x / Protein: Negative / Nitrite: Negative   Leuk Esterase: Negative / RBC: 2-5 /HPF / WBC 2-5 /HPF   Sq Epi: x / Non Sq Epi: x / Bacteria: Few /HPF      Cerebrospinal Fluid Cell Count-1 (17 @ 23:38)    Total Nucleated Cell Count, CSF: 810 /uL    CSF Color: No Color    CSF Appearance: Clear    CSF Lymphocytes: 47 %    CSF Monocytes/Macrophages: 6 %    CSF Segmented Neutrophils: 46 %    Atypical Lymphocytes - CSF: 1 %    Culture - CSF with Gram Stain . (17 @ 02:28)    Gram Stain:   polymorphonuclear leukocytes per low power field  No organisms seen per oil power field  by cytocentrifuge    Specimen Source: .CSF CSF    CSF PCR Result: NotDetec: The meningitis/encephalitis (ME) panel (CSFPCR) is a PCR based assay that  screens for: Escherichia coli; Haemophilus influenzae; Listeria  monocytogenes; Neisseria meningitidis; Streptococcus agalactiae;  Streptococcus pneumoniae; CMV; Enterovirus; HNotDetec: SV-1; HSV-2; Human  herpesvirus 6; Parechovirus; VZV and Cryptococcus. Result should be  interpreted in context of clinical presentation, imaging and other lab  tests. Positive predictive value may be lower in patients with normal CSF  chemistry and cNotDetec: ell count. (17 @ 22:19)    Herpes Simplex Virus 1/2 PCR, CSF (17 @ 04:38)    Source HSV 1/2: CSF    HSV 1/2 PCR: NotDetec: HSV1/2 PCR assay is a real-time PCR test that detects and differentiates  HSV-1 and/or HSV-2 DNA in CSF (Vitreous Fluid). The results of this test  should be interpreted with consideration of all clinical and laboratory  findings.    Culture - CSF with Gram Stain . (17 @ 02:28)    Gram Stain:   polymorphonuclear leukocytes per low power field  No organisms seen per oil power field  by cytocentrifuge    Specimen Source: .CSF CSF    Culture Results:   Rare Gram Positive Rods          Cecile Nunez, PGY1  Pager: [phone] Patient is a 21y old  Female who presents with a chief complaint of Neck rigidity, headache, fevers (19 Sep 2017 06:12)      SUBJECTIVE / OVERNIGHT EVENTS: Patient seen and examined at bedside.  Patient reports intermittent headache, throbbing, posterior to anterior, 4/10. She endorses stable nuchal rigidity and heaviness. She reports feeling febrile with chills overnight. She denies nausea, vomiting, odynophagia, chest pain, cough, dyspnea, abdominal pain, constipation, diarrhea, depression.    MEDICATIONS  (STANDING):  cefTRIAXone   IVPB 2 Gram(s) IV Intermittent every 12 hours  vancomycin  IVPB 1000 milliGRAM(s) IV Intermittent every 12 hours  enoxaparin Injectable 40 milliGRAM(s) SubCutaneous every 24 hours  sodium chloride 0.9%. 1000 milliLiter(s) (125 mL/Hr) IV Continuous <Continuous>  ampicillin  IVPB 2 Gram(s) IV Intermittent every 4 hours  acetaminophen  IVPB 1000 milliGRAM(s) IV Intermittent once    MEDICATIONS  (PRN):  acetaminophen   Tablet 650 milliGRAM(s) Oral every 6 hours PRN For Temp greater than 38 C (100.4 F)  ondansetron Injectable 4 milliGRAM(s) IV Push every 8 hours PRN Nausea and/or Vomiting  acetaminophen   Tablet. 650 milliGRAM(s) Oral every 6 hours PRN Mild Pain (1 - 3)  ketorolac   Injectable 15 milliGRAM(s) IV Push every 6 hours PRN Severe Pain (7 - 10)        Vital Signs Last 24 Hrs  T(C): 37.3 (17 @ 07:15), Max: 39.3 (17 @ 21:46)  T(F): 99.1 (17 @ 07:15), Max: 102.7 (17 @ 21:46)  HR: 114 (17 @ 07:15) (109 - 121)  BP: 104/63 (17 @ 07:15) (104/63 - 122/76)  RR: 17 (17 @ 07:15) (17 - 18)  SpO2: 97% (17 @ 07:15) (97% - 98%)        I&O's Summary      PHYSICAL EXAM:   GENERAL: young female, in NAD, laying in bed   HEAD:  Atraumatic, Normocephalic  EYES: EOMI, PERRLA, conjunctiva and sclera clear  NECK: nuchal rigidity  no C-spine TTP, pain on flexion of neck  CHEST/LUNG: Clear to auscultation bilaterally; No wheeze  HEART: Regular rate and rhythm; No murmurs, rubs, or gallops  ABDOMEN: Soft, Nontender, Nondistended; Bowel sounds present  EXTREMITIES:  2+ Peripheral Pulses, No clubbing, cyanosis, or edema  PSYCH: AAOx3  NEUROLOGY: non-focal, CN II-XII intact , +Brudzinski and Kernig sign   SKIN: No rashes or lesions    LABS:               Urinalysis Basic - ( 18 Sep 2017 22:06 )    Color: x / Appearance: Clear / S.010 / pH: x  Gluc: x / Ketone: Moderate  / Bili: Negative / Urobili: Negative   Blood: x / Protein: Negative / Nitrite: Negative   Leuk Esterase: Negative / RBC: 2-5 /HPF / WBC 2-5 /HPF   Sq Epi: x / Non Sq Epi: x / Bacteria: Few /HPF      Cerebrospinal Fluid Cell Count-1 (17 @ 23:38)    Total Nucleated Cell Count, CSF: 810 /uL    CSF Color: No Color    CSF Appearance: Clear    CSF Lymphocytes: 47 %    CSF Monocytes/Macrophages: 6 %    CSF Segmented Neutrophils: 46 %    Atypical Lymphocytes - CSF: 1 %    Culture - CSF with Gram Stain . (17 @ 02:28)    Gram Stain:   polymorphonuclear leukocytes per low power field  No organisms seen per oil power field  by cytocentrifuge    Specimen Source: .CSF CSF    CSF PCR Result: NotDetec: The meningitis/encephalitis (ME) panel (CSFPCR) is a PCR based assay that  screens for: Escherichia coli; Haemophilus influenzae; Listeria  monocytogenes; Neisseria meningitidis; Streptococcus agalactiae;  Streptococcus pneumoniae; CMV; Enterovirus; HNotDetec: SV-1; HSV-2; Human  herpesvirus 6; Parechovirus; VZV and Cryptococcus. Result should be  interpreted in context of clinical presentation, imaging and other lab  tests. Positive predictive value may be lower in patients with normal CSF  chemistry and cNotDetec: ell count. (17 @ 22:19)    Herpes Simplex Virus 1/2 PCR, CSF (17 @ 04:38)    Source HSV 1/2: CSF    HSV 1/2 PCR: NotDete: HSV1/2 PCR assay is a real-time PCR test that detects and differentiates  HSV-1 and/or HSV-2 DNA in CSF (Vitreous Fluid). The results of this test  should be interpreted with consideration of all clinical and laboratory  findings.    Culture - CSF with Gram Stain . (17 @ 02:28)    Gram Stain:   polymorphonuclear leukocytes per low power field  No organisms seen per oil power field  by cytocentrifuge    Specimen Source: .CSF CSF    Culture Results:   Rare Gram Positive Rods          Cceile Nunez, PGY1  Pager: [phone]

## 2017-09-20 NOTE — PROVIDER CONTACT NOTE (CRITICAL VALUE NOTIFICATION) - TEST AND RESULT REPORTED:
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 Monocytogene has not been described. Monocytogene is intrinsically resistant to cephalosporines

## 2017-09-20 NOTE — PROGRESS NOTE ADULT - PROBLEM SELECTOR PLAN 1
-CSF showing normal Glu and elevated Pro more consistent with viral meningitis; however, gram stain with PMNs concerning for bacterial, Cx evidence of gram positive rods, concern for Listeria, Ampicillin started  -If viral likely 2/2 to Enterovirus, patient reports gastroenteritis 1 week prior to admission  -HSV PCR negative, but in immunocompetent pts HSV2 more likely over HSV1 but pt denies any genital lesions, UTD on vaccines  -c/w empiric bacterial meningitis coverage for now: Vanc/CTX, Ampicillin added, continue Acyclovir for viral meningitis  -F/u Quantgold, HIV, Pregnancy test   -ID Consulted, will f/u recs   -no seizures, neuro deficit to suggest encephalitis  -droplet precautions  -appreciate Neuro recs

## 2017-09-20 NOTE — PROGRESS NOTE ADULT - PROBLEM SELECTOR PLAN 2
-upon admission, patient tachycardic and febrile, concern for meningitis s/p LP  -F/u blood cx, CSF c/x, gram positive rods, concern for Listeria, however CSF PCR negative   -F/u CSF speication   -RVP negative, UA not suggestive of infection

## 2017-09-20 NOTE — PROGRESS NOTE ADULT - ATTENDING COMMENTS
Patient seen and examined in AM . febrile overnight and now afebrile. exam unchanged labs and culture noted. CSF culture with rare listeria , csf PCR is negative and CSF herpes PCR is negative   20 Y/O/F with PMH of Migraines presented with HA,  fever neck stiffness, photophobia. CSF with elevated with protein positive gram positive rods ( rare Listeria monocytogenes). ID note appreciated on Ampicillin, ceftriaxone and vancomycin. continue to monitor closely, will d/w neuro and ID.   D/w patient and her mother at bed side. Agree with plan

## 2017-09-20 NOTE — PROVIDER CONTACT NOTE (MEDICATION) - SITUATION
Stat gentamicin schedule for 6pm, day nurse reschedule it for 8pm, it was late from pharmacy. When I went to hang it, that infectious disease DR Perez told her that Gentamicin would start shady.

## 2017-09-21 LAB
ANION GAP SERPL CALC-SCNC: 13 MMOL/L — SIGNIFICANT CHANGE UP (ref 5–17)
BUN SERPL-MCNC: 7 MG/DL — SIGNIFICANT CHANGE UP (ref 7–23)
CALCIUM SERPL-MCNC: 8.6 MG/DL — SIGNIFICANT CHANGE UP (ref 8.4–10.5)
CHLORIDE SERPL-SCNC: 100 MMOL/L — SIGNIFICANT CHANGE UP (ref 96–108)
CO2 SERPL-SCNC: 23 MMOL/L — SIGNIFICANT CHANGE UP (ref 22–31)
CREAT SERPL-MCNC: 0.64 MG/DL — SIGNIFICANT CHANGE UP (ref 0.5–1.3)
GLUCOSE SERPL-MCNC: 110 MG/DL — HIGH (ref 70–99)
HCG UR QL: NEGATIVE — SIGNIFICANT CHANGE UP
HCT VFR BLD CALC: 34.2 % — LOW (ref 34.5–45)
HGB BLD-MCNC: 11.9 G/DL — SIGNIFICANT CHANGE UP (ref 11.5–15.5)
MCHC RBC-ENTMCNC: 34.2 PG — HIGH (ref 27–34)
MCHC RBC-ENTMCNC: 34.8 GM/DL — SIGNIFICANT CHANGE UP (ref 32–36)
MCV RBC AUTO: 98.3 FL — SIGNIFICANT CHANGE UP (ref 80–100)
PLATELET # BLD AUTO: 303 K/UL — SIGNIFICANT CHANGE UP (ref 150–400)
POTASSIUM SERPL-MCNC: 3.8 MMOL/L — SIGNIFICANT CHANGE UP (ref 3.5–5.3)
POTASSIUM SERPL-SCNC: 3.8 MMOL/L — SIGNIFICANT CHANGE UP (ref 3.5–5.3)
RBC # BLD: 3.48 M/UL — LOW (ref 3.8–5.2)
RBC # FLD: 10.8 % — SIGNIFICANT CHANGE UP (ref 10.3–14.5)
SODIUM SERPL-SCNC: 136 MMOL/L — SIGNIFICANT CHANGE UP (ref 135–145)
WBC # BLD: 10.4 K/UL — SIGNIFICANT CHANGE UP (ref 3.8–10.5)
WBC # FLD AUTO: 10.4 K/UL — SIGNIFICANT CHANGE UP (ref 3.8–10.5)

## 2017-09-21 PROCEDURE — 99232 SBSQ HOSP IP/OBS MODERATE 35: CPT

## 2017-09-21 PROCEDURE — 99233 SBSQ HOSP IP/OBS HIGH 50: CPT | Mod: GC

## 2017-09-21 RX ORDER — GENTAMICIN SULFATE 40 MG/ML
80 VIAL (ML) INJECTION EVERY 8 HOURS
Qty: 0 | Refills: 0 | Status: DISCONTINUED | OUTPATIENT
Start: 2017-09-21 | End: 2017-09-26

## 2017-09-21 RX ORDER — LACTOBACILLUS ACIDOPHILUS 100MM CELL
1 CAPSULE ORAL DAILY
Qty: 0 | Refills: 0 | Status: DISCONTINUED | OUTPATIENT
Start: 2017-09-21 | End: 2017-09-26

## 2017-09-21 RX ADMIN — Medication 216 GRAM(S): at 01:07

## 2017-09-21 RX ADMIN — Medication 650 MILLIGRAM(S): at 15:30

## 2017-09-21 RX ADMIN — Medication 216 GRAM(S): at 21:16

## 2017-09-21 RX ADMIN — Medication 650 MILLIGRAM(S): at 05:40

## 2017-09-21 RX ADMIN — Medication 216 GRAM(S): at 05:19

## 2017-09-21 RX ADMIN — Medication 216 GRAM(S): at 18:16

## 2017-09-21 RX ADMIN — Medication 216 GRAM(S): at 13:55

## 2017-09-21 RX ADMIN — Medication 650 MILLIGRAM(S): at 15:09

## 2017-09-21 RX ADMIN — Medication 216 GRAM(S): at 10:14

## 2017-09-21 RX ADMIN — Medication 200 MILLIGRAM(S): at 21:15

## 2017-09-21 RX ADMIN — Medication 1 TABLET(S): at 20:49

## 2017-09-21 RX ADMIN — Medication 200 MILLIGRAM(S): at 08:37

## 2017-09-21 RX ADMIN — Medication 650 MILLIGRAM(S): at 05:10

## 2017-09-21 NOTE — PROGRESS NOTE ADULT - PROBLEM SELECTOR PLAN 2
-upon admission, patient tachycardic and febrile, concern for meningitis s/p LP  -CSF Cx grew listeria, on Amp/gent (holding gent until urine hcg back)  -RVP negative, UA not suggestive of infection  -WBC decreasing, hemodynamically stable -upon admission, patient tachycardic and febrile, concern for meningitis s/p LP  -CSF Cx grew listeria, on Amp/gent (holding gent until urine hcg back)  -RVP negative, UA not suggestive of infection  -WBC decreasing, hemodynamically stable  -Trend CBC, monitor BP.

## 2017-09-21 NOTE — PROGRESS NOTE ADULT - ASSESSMENT
22yo F with PMH of Migraines (attacks 2-3 x year, respond to excedrin) presenting HA and fever x1 day. Patient with HA, neck stiffness, photophobia. CSF positive with high cells, mixed neutrophils/lymphs. Protein/glucose not particularly abnormal. Initial culture now with Listeria. Uncertain cause for primary listeria meningitis. No signs immunocompromise. Yesterday for worsening fevers and persistent headache, Gent was added on--however before continuing with Gent would send pregnancy screening test. Patient otherwise appears stable.  - Amp 2g q 4  - Gent 80 q 8 (on hold)  - Pregnancy Test--if negative, would restart Gent (if positive would require risk/benefits discussion--but would likely proceed of)  - Consider MRI Brain to R/O Rhomboencephalitis--although it is uncertain if would       Mika Manuel MD  Pager [phone]  After 5pm and on weekends call [phone]

## 2017-09-21 NOTE — PROGRESS NOTE ADULT - SUBJECTIVE AND OBJECTIVE BOX
Patient is a 21y old  Female who presents with a chief complaint of Neck rigidity, headache, fevers (19 Sep 2017 06:12)      SUBJECTIVE / OVERNIGHT EVENTS: No acute events overnight. She denies nausea, vomiting, odynophagia, chest pain, cough, dyspnea, abdominal pain, constipation, diarrhea, depression.    MEDICATIONS  (STANDING):  enoxaparin Injectable 40 milliGRAM(s) SubCutaneous every 24 hours  sodium chloride 0.9%. 1000 milliLiter(s) (125 mL/Hr) IV Continuous <Continuous>  ampicillin  IVPB 2 Gram(s) IV Intermittent every 4 hours  acetaminophen  IVPB 1000 milliGRAM(s) IV Intermittent once    MEDICATIONS  (PRN):  acetaminophen   Tablet 650 milliGRAM(s) Oral every 6 hours PRN For Temp greater than 38 C (100.4 F)  ondansetron Injectable 4 milliGRAM(s) IV Push every 8 hours PRN Nausea and/or Vomiting  acetaminophen   Tablet. 650 milliGRAM(s) Oral every 6 hours PRN Mild Pain (1 - 3)        Vital Signs Last 24 Hrs  T(C): 37.9 (21 Sep 2017 04:56), Max: 38.5 (20 Sep 2017 17:35)  T(F): 100.2 (21 Sep 2017 04:56), Max: 101.3 (20 Sep 2017 17:35)  HR: 112 (21 Sep 2017 04:56) (80 - 112)  BP: 119/70 (21 Sep 2017 04:56) (107/76 - 119/70)  BP(mean): --  ABP: --  ABP(mean): --  RR: 18 (21 Sep 2017 04:56) (16 - 18)  SpO2: 97% (21 Sep 2017 04:56) (97% - 99%)          I&O's Summary      PHYSICAL EXAM:   GENERAL: NAD, laying in bed, well developed    HEAD:  Atraumatic, Normocephalic  EYES: EOMI, PERRLA, conjunctiva and sclera clear  NECK: No neck stiffness   CHEST/LUNG: Clear to auscultation bilaterally; No wheeze  HEART: Regular rate and rhythm; No murmurs, rubs, or gallops  ABDOMEN: Soft, Nontender, Nondistended; Bowel sounds present  EXTREMITIES:  2+ Peripheral Pulses, No clubbing, cyanosis, or edema  PSYCH: AAOx3  NEUROLOGY: non-focal, CN II-XII intact   SKIN: No rashes or lesions    LABS:                            11.9   10.4  )-----------( 303      ( 21 Sep 2017 06:57 )             34.2     21 Sep 2017 06:57    136    |  100    |  7      ----------------------------<  110    3.8     |  23     |  0.64     Ca    8.6        21 Sep 2017 06:57          Cerebrospinal Fluid Cell Count-1 (09.18.17 @ 23:38)    Total Nucleated Cell Count, CSF: 810 /uL    CSF Color: No Color    CSF Appearance: Clear    CSF Lymphocytes: 47 %    CSF Monocytes/Macrophages: 6 %    CSF Segmented Neutrophils: 46 %    Atypical Lymphocytes - CSF: 1 %    Culture - CSF with Gram Stain . (09.19.17 @ 02:28)    Gram Stain:   polymorphonuclear leukocytes per low power field  No organisms seen per oil power field  by cytocentrifuge    Specimen Source: .CSF CSF      Culture - CSF with Gram Stain . (09.19.17 @ 02:28)    Gram Stain:   polymorphonuclear leukocytes per low power field  No organisms seen per oil power field  by cytocentrifuge    Specimen Source: .CSF CSF    Culture Results:   Rare Gram Positive Rods Patient is a 21y old  Female who presents with a chief complaint of Neck rigidity, headache, fevers (19 Sep 2017 06:12)      SUBJECTIVE / OVERNIGHT EVENTS: No acute events overnight. She denies nausea, vomiting, odynophagia, chest pain, cough, dyspnea, abdominal pain, constipation, diarrhea, depression. Reports mild photophobia. No neck pain.    MEDICATIONS  (STANDING):  enoxaparin Injectable 40 milliGRAM(s) SubCutaneous every 24 hours  sodium chloride 0.9%. 1000 milliLiter(s) (125 mL/Hr) IV Continuous <Continuous>  ampicillin  IVPB 2 Gram(s) IV Intermittent every 4 hours  acetaminophen  IVPB 1000 milliGRAM(s) IV Intermittent once    MEDICATIONS  (PRN):  acetaminophen   Tablet 650 milliGRAM(s) Oral every 6 hours PRN For Temp greater than 38 C (100.4 F)  ondansetron Injectable 4 milliGRAM(s) IV Push every 8 hours PRN Nausea and/or Vomiting  acetaminophen   Tablet. 650 milliGRAM(s) Oral every 6 hours PRN Mild Pain (1 - 3)        Vital Signs Last 24 Hrs  T(C): 37.9 (21 Sep 2017 04:56), Max: 38.5 (20 Sep 2017 17:35)  T(F): 100.2 (21 Sep 2017 04:56), Max: 101.3 (20 Sep 2017 17:35)  HR: 112 (21 Sep 2017 04:56) (80 - 112)  BP: 119/70 (21 Sep 2017 04:56) (107/76 - 119/70)  BP(mean): --  ABP: --  ABP(mean): --  RR: 18 (21 Sep 2017 04:56) (16 - 18)  SpO2: 97% (21 Sep 2017 04:56) (97% - 99%)          I&O's Summary      PHYSICAL EXAM:   GENERAL: NAD, laying in bed, well developed    HEAD:  Atraumatic, Normocephalic  EYES: EOMI, PERRLA, conjunctiva and sclera clear  NECK: No neck stiffness   CHEST/LUNG: Clear to auscultation bilaterally; No wheeze  HEART: Regular rate and rhythm; No murmurs, rubs, or gallops  ABDOMEN: Soft, Nontender, Nondistended; Bowel sounds present  EXTREMITIES:  2+ Peripheral Pulses, No clubbing, cyanosis, or edema  PSYCH: AAOx3  NEUROLOGY: non-focal, CN II-XII intact   SKIN: No rashes or lesions    LABS:                            11.9   10.4  )-----------( 303      ( 21 Sep 2017 06:57 )             34.2     21 Sep 2017 06:57    136    |  100    |  7      ----------------------------<  110    3.8     |  23     |  0.64     Ca    8.6        21 Sep 2017 06:57          Cerebrospinal Fluid Cell Count-1 (09.18.17 @ 23:38)    Total Nucleated Cell Count, CSF: 810 /uL    CSF Color: No Color    CSF Appearance: Clear    CSF Lymphocytes: 47 %    CSF Monocytes/Macrophages: 6 %    CSF Segmented Neutrophils: 46 %    Atypical Lymphocytes - CSF: 1 %    Culture - CSF with Gram Stain . (09.19.17 @ 02:28)    Gram Stain:   polymorphonuclear leukocytes per low power field  No organisms seen per oil power field  by cytocentrifuge    Specimen Source: .CSF CSF      Culture - CSF with Gram Stain . (09.19.17 @ 02:28)    Gram Stain:   polymorphonuclear leukocytes per low power field  No organisms seen per oil power field  by cytocentrifuge    Specimen Source: .CSF CSF    Culture Results:   Rare Gram Positive Rods    Discussed with Consultants: Dr. Manuel regarding gentamicin dosing and monitoring

## 2017-09-21 NOTE — PROGRESS NOTE ADULT - SUBJECTIVE AND OBJECTIVE BOX
CC: F/U for Meningitis    Saw/spoke to patient. Patient overall well. Still fevers yesterday evening, still headache.    Allergies  No Known Allergies    ANTIMICROBIALS:  ampicillin  IVPB 2 every 4 hours    PE:    Vital Signs Last 24 Hrs  T(C): 37.9 (21 Sep 2017 04:56), Max: 38.5 (20 Sep 2017 17:35)  T(F): 100.2 (21 Sep 2017 04:56), Max: 101.3 (20 Sep 2017 17:35)  HR: 112 (21 Sep 2017 04:56) (80 - 112)  BP: 119/70 (21 Sep 2017 04:56) (107/76 - 119/70)  BP(mean): --  RR: 18 (21 Sep 2017 04:56) (16 - 18)  SpO2: 97% (21 Sep 2017 04:56) (97% - 99%)    Gen: AOx3, NAD, non-toxic, pleasant  CV: S1+S2 normal, no murmurs, nontachycardic  Resp: Clear bilat, no resp distress, no crackles/wheezes  Abd: Soft, nontender, +BS  Ext: No LE edema, no wounds    LABS:                        11.9   10.4  )-----------( 303      ( 21 Sep 2017 06:57 )             34.2     09-21    136  |  100  |  7   ----------------------------<  110<H>  3.8   |  23  |  0.64    Ca    8.6      21 Sep 2017 06:57    MICROBIOLOGY:    9/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

## 2017-09-21 NOTE — PROGRESS NOTE ADULT - PROBLEM SELECTOR PLAN 4
-previously controlled with Excedrin -previously controlled with Excedrin  -Tylenol PRN; avoid NSAIDs while on gentamicin to limit nephrotoxicity risk

## 2017-09-21 NOTE — PROGRESS NOTE ADULT - PROBLEM SELECTOR PLAN 5
-DVT PPX: Lovenox until ambulatory            Khurram Guy M.D.  Medicine Resident, PGY-2  Pager: [phone] -DVT PPX: Lovenox until ambulatory

## 2017-09-21 NOTE — AUDIOLOGICAL ASSESSMENT - COMMENTS
Hx: Dx Meningitis- baseline audio prior to gentamicin tx.   Results: Hearing within normal limits bilaterally. Otoscopy unremarkable bilaterally.  Recommendations: Audiological re evaluation as per [facility] protocol.

## 2017-09-21 NOTE — PROGRESS NOTE ADULT - ASSESSMENT
20 yo F with PMHx of migraines presenting headache and Fever found to be septic with concern for viral vs. bacterial meningitis s/p LP, culture gram positive rods, Listeria. Pt currently on Ampicillin, holding gentamicin until urine hcg neg.

## 2017-09-21 NOTE — PROGRESS NOTE ADULT - ATTENDING COMMENTS
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 Plan to continue ampicillin today, and add gentimicin once pregnancy is ruled out. Will need blood level monitoring for toxicity. Limit nephrotoxins as able.

## 2017-09-21 NOTE — PROGRESS NOTE ADULT - PROBLEM SELECTOR PLAN 1
-CSF showing normal Glu and elevated Pro more consistent with viral meningitis; however, culture grew Listeria   -Pt on Amp/gent as per ID for synergy, holding gent until urine hcg  -Monitor fever curve, WBC, gent trough before 4th dose   -Audiology consult for baseline hearing Listeria meningitis with confirmed sepsis    -CSF showing normal Glu and elevated Pro more consistent with viral meningitis; however, culture grew Listeria   -Pt on Amp/gent as per ID for synergy, holding gent until urine hcg  -Monitor fever curve, WBC, gent trough before 4th dose per ID  -Audiology consult for baseline hearing testing

## 2017-09-22 LAB
ANION GAP SERPL CALC-SCNC: 10 MMOL/L — SIGNIFICANT CHANGE UP (ref 5–17)
BUN SERPL-MCNC: 5 MG/DL — LOW (ref 7–23)
CALCIUM SERPL-MCNC: 8.9 MG/DL — SIGNIFICANT CHANGE UP (ref 8.4–10.5)
CHLORIDE SERPL-SCNC: 102 MMOL/L — SIGNIFICANT CHANGE UP (ref 96–108)
CO2 SERPL-SCNC: 26 MMOL/L — SIGNIFICANT CHANGE UP (ref 22–31)
CREAT SERPL-MCNC: 0.68 MG/DL — SIGNIFICANT CHANGE UP (ref 0.5–1.3)
CRYPTOC AG CSF-ACNC: NEGATIVE — SIGNIFICANT CHANGE UP
GENTAMICIN TROUGH SERPL-MCNC: 0.2 UG/ML — SIGNIFICANT CHANGE UP (ref 0–2)
GLUCOSE SERPL-MCNC: 89 MG/DL — SIGNIFICANT CHANGE UP (ref 70–99)
HCT VFR BLD CALC: 37 % — SIGNIFICANT CHANGE UP (ref 34.5–45)
HGB BLD-MCNC: 13 G/DL — SIGNIFICANT CHANGE UP (ref 11.5–15.5)
M TB TUBERC IFN-G BLD QL: 0.01 IU/ML — SIGNIFICANT CHANGE UP
M TB TUBERC IFN-G BLD QL: 0.05 IU/ML — SIGNIFICANT CHANGE UP
M TB TUBERC IFN-G BLD QL: NEGATIVE — SIGNIFICANT CHANGE UP
MCHC RBC-ENTMCNC: 34.7 PG — HIGH (ref 27–34)
MCHC RBC-ENTMCNC: 35.1 GM/DL — SIGNIFICANT CHANGE UP (ref 32–36)
MCV RBC AUTO: 98.8 FL — SIGNIFICANT CHANGE UP (ref 80–100)
MITOGEN IGNF BCKGRD COR BLD-ACNC: 3.86 IU/ML — SIGNIFICANT CHANGE UP
PLATELET # BLD AUTO: 316 K/UL — SIGNIFICANT CHANGE UP (ref 150–400)
POTASSIUM SERPL-MCNC: 3.9 MMOL/L — SIGNIFICANT CHANGE UP (ref 3.5–5.3)
POTASSIUM SERPL-SCNC: 3.9 MMOL/L — SIGNIFICANT CHANGE UP (ref 3.5–5.3)
RBC # BLD: 3.75 M/UL — LOW (ref 3.8–5.2)
RBC # FLD: 10.9 % — SIGNIFICANT CHANGE UP (ref 10.3–14.5)
SODIUM SERPL-SCNC: 138 MMOL/L — SIGNIFICANT CHANGE UP (ref 135–145)
WBC # BLD: 8.7 K/UL — SIGNIFICANT CHANGE UP (ref 3.8–10.5)
WBC # FLD AUTO: 8.7 K/UL — SIGNIFICANT CHANGE UP (ref 3.8–10.5)

## 2017-09-22 PROCEDURE — 99233 SBSQ HOSP IP/OBS HIGH 50: CPT | Mod: GC

## 2017-09-22 PROCEDURE — 99232 SBSQ HOSP IP/OBS MODERATE 35: CPT

## 2017-09-22 RX ADMIN — Medication 650 MILLIGRAM(S): at 21:58

## 2017-09-22 RX ADMIN — Medication 200 MILLIGRAM(S): at 22:22

## 2017-09-22 RX ADMIN — Medication 216 GRAM(S): at 18:09

## 2017-09-22 RX ADMIN — Medication 650 MILLIGRAM(S): at 00:46

## 2017-09-22 RX ADMIN — Medication 216 GRAM(S): at 02:09

## 2017-09-22 RX ADMIN — Medication 216 GRAM(S): at 21:27

## 2017-09-22 RX ADMIN — Medication 216 GRAM(S): at 14:28

## 2017-09-22 RX ADMIN — Medication 650 MILLIGRAM(S): at 01:46

## 2017-09-22 RX ADMIN — Medication 1 TABLET(S): at 14:33

## 2017-09-22 RX ADMIN — Medication 216 GRAM(S): at 09:44

## 2017-09-22 RX ADMIN — Medication 200 MILLIGRAM(S): at 08:16

## 2017-09-22 RX ADMIN — Medication 650 MILLIGRAM(S): at 21:28

## 2017-09-22 RX ADMIN — Medication 200 MILLIGRAM(S): at 15:11

## 2017-09-22 RX ADMIN — ONDANSETRON 4 MILLIGRAM(S): 8 TABLET, FILM COATED ORAL at 10:54

## 2017-09-22 RX ADMIN — Medication 216 GRAM(S): at 07:16

## 2017-09-22 NOTE — PROGRESS NOTE ADULT - PROBLEM SELECTOR PLAN 1
Listeria meningitis with confirmed sepsis  -CSF showing normal Glu and elevated Pro more consistent with viral meningitis; however, culture grew Listeria   -Pt on Amp/gent as per ID for synergy  -Monitor fever curve, WBC, gent trough before 4th dose per ID  -Audiology: normal baseline hearing  -f/u with ID Listeria meningitis with confirmed sepsis  -CSF showing normal Glu and elevated Pro more consistent with viral meningitis; however, culture grew Listeria   -Pt on Amp/gent as per ID for synergy  -Monitor fever curve, WBC, gent trough before 4th dose per ID  -Audiology: normal baseline hearing  -f/u with ID  -Tylenol PRN pain, zofran PRN nausea

## 2017-09-22 NOTE — PROGRESS NOTE ADULT - PROBLEM SELECTOR PLAN 2
-upon admission, patient tachycardic and febrile, concern for meningitis s/p LP  -CSF Cx grew listeria, on Amp/gent   -RVP negative, UA not suggestive of infection  -WBC decreasing, hemodynamically stable  -Trend CBC, monitor BP.

## 2017-09-22 NOTE — PROGRESS NOTE ADULT - PROBLEM SELECTOR PLAN 4
-previously controlled with Excedrin  -Tylenol PRN; avoid NSAIDs while on gentamicin to limit nephrotoxicity risk

## 2017-09-22 NOTE — PROGRESS NOTE ADULT - ASSESSMENT
22yo F with PMH of Migraines (attacks 2-3 x year, respond to excedrin) presenting HA and fever x1 day. Patient with HA, neck stiffness, photophobia. CSF positive with high cells, mixed neutrophils/lymphs. Protein/glucose not particularly abnormal. Initial culture now with Listeria. Uncertain cause for primary listeria meningitis. No signs immunocompromise. Yesterday for worsening fevers and persistent headache, on amp with gent for synergy. Continue abx through weekend.  - Amp 2g q 4  - Gent 80 q 8; trough 0.2 (appropriate)  - Consider MRI Brain to R/O Rhomboencephalitis--although it is uncertain if would  (would pursue if any neurological symptoms manifest)  - Appreciate Audiology eval  - Over weekend, call [phone] with change in status or questions    Mika Manuel MD  Pager [phone]  After 5pm and on weekends call [phone]

## 2017-09-22 NOTE — PROGRESS NOTE ADULT - SUBJECTIVE AND OBJECTIVE BOX
Patient is a 21y old  Female who presents with a chief complaint of Neck rigidity, headache, fevers (19 Sep 2017 06:12)      SUBJECTIVE / OVERNIGHT EVENTS: No acute events overnight. Pt had 4/10 headache overnight, resolved after Tylenol. Denies: fevers, chills, neck stiffness, N/V/D     MEDICATIONS  (STANDING):  enoxaparin Injectable 40 milliGRAM(s) SubCutaneous every 24 hours  sodium chloride 0.9%. 1000 milliLiter(s) (125 mL/Hr) IV Continuous <Continuous>  ampicillin  IVPB 2 Gram(s) IV Intermittent every 4 hours  acetaminophen  IVPB 1000 milliGRAM(s) IV Intermittent once  gentamicin   IVPB 80 milliGRAM(s) IV Intermittent every 8 hours  lactobacillus acidophilus 1 Tablet(s) Oral daily    MEDICATIONS  (PRN):  acetaminophen   Tablet 650 milliGRAM(s) Oral every 6 hours PRN For Temp greater than 38 C (100.4 F)  ondansetron Injectable 4 milliGRAM(s) IV Push every 8 hours PRN Nausea and/or Vomiting  acetaminophen   Tablet. 650 milliGRAM(s) Oral every 6 hours PRN Mild Pain (1 - 3)          Vital Signs Last 24 Hrs  T(C): 36.6 (22 Sep 2017 04:24), Max: 37.3 (22 Sep 2017 03:45)  T(F): 97.9 (22 Sep 2017 04:24), Max: 99.1 (22 Sep 2017 03:45)  HR: 104 (22 Sep 2017 04:24) (69 - 104)  BP: 107/69 (22 Sep 2017 04:24) (107/69 - 121/86)  BP(mean): --  RR: 18 (22 Sep 2017 04:24) (16 - 18)  SpO2: 99% (22 Sep 2017 04:24) (98% - 100%)      I&O's Summary      PHYSICAL EXAM:   GENERAL: NAD, laying in bed, well developed    HEAD:  Atraumatic, Normocephalic  EYES: EOMI, PERRLA, conjunctiva and sclera clear  NECK: No neck stiffness  CHEST/LUNG: Clear to auscultation bilaterally; No wheeze  HEART: Regular rate and rhythm; No murmurs, rubs, or gallops  ABDOMEN: Soft, Nontender, Nondistended; Bowel sounds present  EXTREMITIES:  2+ Peripheral Pulses, No clubbing, cyanosis, or edema  PSYCH: AAOx3  NEUROLOGY: non-focal, CN II-XII intact   SKIN: No rashes or lesions    LABS:                            13.0   8.7   )-----------( 316      ( 22 Sep 2017 07:01 )             37.0     22 Sep 2017 07:01    138    |  102    |  5      ----------------------------<  89     3.9     |  26     |  0.68     Ca    8.9        22 Sep 2017 07:01        Cerebrospinal Fluid Cell Count-1 (09.18.17 @ 23:38)    Total Nucleated Cell Count, CSF: 810 /uL    CSF Color: No Color    CSF Appearance: Clear    CSF Lymphocytes: 47 %    CSF Monocytes/Macrophages: 6 %    CSF Segmented Neutrophils: 46 %    Atypical Lymphocytes - CSF: 1 %    Culture - CSF with Gram Stain . (09.19.17 @ 02:28)    Gram Stain:   polymorphonuclear leukocytes per low power field  No organisms seen per oil power field  by cytocentrifuge    Specimen Source: .CSF CSF      Culture - CSF with Gram Stain . (09.19.17 @ 02:28)    Gram Stain:   polymorphonuclear leukocytes per low power field  No organisms seen per oil power field  by cytocentrifuge    Specimen Source: .CSF CSF    Culture Results:   Rare Gram Positive Rods    Radiology personally reviewed:  Discussed with Consultants:   Consultant Notes reviewed: Audiology

## 2017-09-22 NOTE — PROGRESS NOTE ADULT - ASSESSMENT
20 yo F with PMHx of migraines presenting headache and Fever found to be septic with concern for viral vs. bacterial meningitis s/p LP, culture gram positive rods, Listeria. Pt currently on Ampicillin, gentamicin.

## 2017-09-22 NOTE — PROGRESS NOTE ADULT - SUBJECTIVE AND OBJECTIVE BOX
CC: F/U for Listeria Meningitis    Saw/spoke to patient. Now afeb x 24, still headache but improving. No focal neuro complaints.    Allergies  No Known Allergies    ANTIMICROBIALS:  ampicillin  IVPB 2 every 4 hours  gentamicin   IVPB 80 every 8 hours    PE:    Vital Signs Last 24 Hrs  T(C): 36.6 (22 Sep 2017 04:24), Max: 37.3 (22 Sep 2017 03:45)  T(F): 97.9 (22 Sep 2017 04:24), Max: 99.1 (22 Sep 2017 03:45)  HR: 104 (22 Sep 2017 04:24) (69 - 104)  BP: 107/69 (22 Sep 2017 04:24) (107/69 - 121/86)  BP(mean): --  RR: 18 (22 Sep 2017 04:24) (16 - 18)  SpO2: 99% (22 Sep 2017 04:24) (98% - 100%)    Gen: AOx3, NAD, non-toxic, pleasant  CV: S1+S2 normal, no murmurs, tachycardic  Resp: Clear bilat, no resp distress, no crackles/wheezes  Abd: Soft, nontender, +BS  Ext: No LE edema, no wounds    LABS:                        13.0   8.7   )-----------( 316      ( 22 Sep 2017 07:01 )             37.0     09-22    138  |  102  |  5<L>  ----------------------------<  89  3.9   |  26  |  0.68    Ca    8.9      22 Sep 2017 07:01    MICROBIOLOGY:  Gentamicin Level, Trough: 0.2 ug/mL (09-22-17 @ 07:01)    HSV 1/2 PCR: NotDetec (09-19 @ 04:38)  Rapid RVP Result: NotDetec (09-18 @ 22:06)    (no new otherwise)    RADIOLOGY:    No new available

## 2017-09-22 NOTE — PROGRESS NOTE ADULT - ATTENDING COMMENTS
Clinically much improved, despite short episode of nausea this morning. Now walking through halls, photophobia resolved, headaches only intermittent, and mental status at baseline. Stable hemodynamics. No rash.    Continue ampicillin/gentamicin, with blood level monitoring for toxicity. Will defer MRI imaging at present as patient continues to improve on current therapy.

## 2017-09-23 LAB
ANION GAP SERPL CALC-SCNC: 11 MMOL/L — SIGNIFICANT CHANGE UP (ref 5–17)
BUN SERPL-MCNC: 4 MG/DL — LOW (ref 7–23)
CALCIUM SERPL-MCNC: 8.9 MG/DL — SIGNIFICANT CHANGE UP (ref 8.4–10.5)
CHLORIDE SERPL-SCNC: 103 MMOL/L — SIGNIFICANT CHANGE UP (ref 96–108)
CO2 SERPL-SCNC: 26 MMOL/L — SIGNIFICANT CHANGE UP (ref 22–31)
CREAT SERPL-MCNC: 0.59 MG/DL — SIGNIFICANT CHANGE UP (ref 0.5–1.3)
GENTAMICIN TROUGH SERPL-MCNC: 1 UG/ML — SIGNIFICANT CHANGE UP (ref 0–2)
GLUCOSE SERPL-MCNC: 96 MG/DL — SIGNIFICANT CHANGE UP (ref 70–99)
HCT VFR BLD CALC: 36.7 % — SIGNIFICANT CHANGE UP (ref 34.5–45)
HGB BLD-MCNC: 12.6 G/DL — SIGNIFICANT CHANGE UP (ref 11.5–15.5)
MCHC RBC-ENTMCNC: 34 PG — SIGNIFICANT CHANGE UP (ref 27–34)
MCHC RBC-ENTMCNC: 34.4 GM/DL — SIGNIFICANT CHANGE UP (ref 32–36)
MCV RBC AUTO: 98.7 FL — SIGNIFICANT CHANGE UP (ref 80–100)
PLATELET # BLD AUTO: 340 K/UL — SIGNIFICANT CHANGE UP (ref 150–400)
POTASSIUM SERPL-MCNC: 3.6 MMOL/L — SIGNIFICANT CHANGE UP (ref 3.5–5.3)
POTASSIUM SERPL-SCNC: 3.6 MMOL/L — SIGNIFICANT CHANGE UP (ref 3.5–5.3)
RBC # BLD: 3.71 M/UL — LOW (ref 3.8–5.2)
RBC # FLD: 11 % — SIGNIFICANT CHANGE UP (ref 10.3–14.5)
SODIUM SERPL-SCNC: 140 MMOL/L — SIGNIFICANT CHANGE UP (ref 135–145)
WBC # BLD: 6.3 K/UL — SIGNIFICANT CHANGE UP (ref 3.8–10.5)
WBC # FLD AUTO: 6.3 K/UL — SIGNIFICANT CHANGE UP (ref 3.8–10.5)

## 2017-09-23 PROCEDURE — 99233 SBSQ HOSP IP/OBS HIGH 50: CPT | Mod: GC

## 2017-09-23 RX ADMIN — Medication 216 GRAM(S): at 10:01

## 2017-09-23 RX ADMIN — Medication 216 GRAM(S): at 21:43

## 2017-09-23 RX ADMIN — Medication 216 GRAM(S): at 18:05

## 2017-09-23 RX ADMIN — Medication 1 TABLET(S): at 11:09

## 2017-09-23 RX ADMIN — Medication 650 MILLIGRAM(S): at 09:15

## 2017-09-23 RX ADMIN — Medication 200 MILLIGRAM(S): at 06:56

## 2017-09-23 RX ADMIN — Medication 650 MILLIGRAM(S): at 21:43

## 2017-09-23 RX ADMIN — Medication 650 MILLIGRAM(S): at 15:46

## 2017-09-23 RX ADMIN — ONDANSETRON 4 MILLIGRAM(S): 8 TABLET, FILM COATED ORAL at 08:26

## 2017-09-23 RX ADMIN — Medication 216 GRAM(S): at 14:22

## 2017-09-23 RX ADMIN — Medication 650 MILLIGRAM(S): at 08:28

## 2017-09-23 RX ADMIN — ONDANSETRON 4 MILLIGRAM(S): 8 TABLET, FILM COATED ORAL at 21:43

## 2017-09-23 RX ADMIN — Medication 200 MILLIGRAM(S): at 22:30

## 2017-09-23 RX ADMIN — Medication 216 GRAM(S): at 06:15

## 2017-09-23 RX ADMIN — Medication 650 MILLIGRAM(S): at 15:02

## 2017-09-23 RX ADMIN — Medication 650 MILLIGRAM(S): at 22:13

## 2017-09-23 RX ADMIN — Medication 200 MILLIGRAM(S): at 13:45

## 2017-09-23 RX ADMIN — Medication 216 GRAM(S): at 01:47

## 2017-09-23 NOTE — PROGRESS NOTE ADULT - PROBLEM SELECTOR PLAN 2
-upon admission, patient tachycardic and febrile, concern for meningitis s/p LP  -CSF Cx grew listeria, on Amp/gent   -RVP negative, UA not suggestive of infection  -WBC decreasing, hemodynamically stable  -Trend CBC, monitor BP. -upon admission, patient tachycardic and febrile, concern for meningitis s/p LP  -now resolved.  -CSF Cx grew listeria, on Amp/gent   -RVP negative, UA not suggestive of infection  -WBC decreasing, hemodynamically stable  -Trend CBC, monitor BP.

## 2017-09-23 NOTE — PROGRESS NOTE ADULT - PROBLEM SELECTOR PLAN 5
-DVT PPX: Lovenox until ambulatory -IMPROVE score 0.4% VTE, no pharm ppx needed   -pt encouraged to ambulate

## 2017-09-23 NOTE — PROGRESS NOTE ADULT - ATTENDING COMMENTS
continues to improve. has mild headaches and nausea stable with tylenol and zofran prn.  continue ampicillin and gentamicin as dosed for listeria meningitis  continue remainder of plan per resident note  pt encouraged to ambulate.

## 2017-09-23 NOTE — PROGRESS NOTE ADULT - SUBJECTIVE AND OBJECTIVE BOX
Patient is a 21y old  Female who presents with a chief complaint of Neck rigidity, headache, fevers (19 Sep 2017 06:12)      SUBJECTIVE / OVERNIGHT EVENTS: No acute events overnight. Pt had mild headache resolved w/ Tylenol. Denies: fevers, chills, neck stiffness, N/V/D     MEDICATIONS  (STANDING):  enoxaparin Injectable 40 milliGRAM(s) SubCutaneous every 24 hours  sodium chloride 0.9%. 1000 milliLiter(s) (125 mL/Hr) IV Continuous <Continuous>  ampicillin  IVPB 2 Gram(s) IV Intermittent every 4 hours  acetaminophen  IVPB 1000 milliGRAM(s) IV Intermittent once  gentamicin   IVPB 80 milliGRAM(s) IV Intermittent every 8 hours  lactobacillus acidophilus 1 Tablet(s) Oral daily    MEDICATIONS  (PRN):  acetaminophen   Tablet 650 milliGRAM(s) Oral every 6 hours PRN For Temp greater than 38 C (100.4 F)  ondansetron Injectable 4 milliGRAM(s) IV Push every 8 hours PRN Nausea and/or Vomiting  acetaminophen   Tablet. 650 milliGRAM(s) Oral every 6 hours PRN Mild Pain (1 - 3)      Vital Signs Last 24 Hrs  T(C): 36.6 (23 Sep 2017 04:29), Max: 37.1 (22 Sep 2017 21:52)  T(F): 97.9 (23 Sep 2017 04:29), Max: 98.7 (22 Sep 2017 21:52)  HR: 80 (23 Sep 2017 04:29) (80 - 98)  BP: 115/78 (23 Sep 2017 04:29) (102/71 - 118/84)  BP(mean): --  RR: 16 (23 Sep 2017 04:29) (16 - 20)  SpO2: 98% (23 Sep 2017 04:29) (98% - 100%)    PHYSICAL EXAM:   GENERAL: NAD, laying in bed, well developed    HEAD:  Atraumatic, Normocephalic  EYES: EOMI, PERRLA, conjunctiva and sclera clear  NECK: No neck stiffness  CHEST/LUNG: Clear to auscultation bilaterally; No wheeze  HEART: Regular rate and rhythm; No murmurs, rubs, or gallops  ABDOMEN: Soft, Nontender, Nondistended; Bowel sounds present  EXTREMITIES:  2+ Peripheral Pulses, No clubbing, cyanosis, or edema  PSYCH: AAOx3  NEUROLOGY: non-focal, CN II-XII intact   SKIN: No rashes or lesions    LABS:                            12.6   6.3   )-----------( 340      ( 23 Sep 2017 05:57 )             36.7     23 Sep 2017 05:57    140    |  103    |  4      ----------------------------<  96     3.6     |  26     |  0.59     Ca    8.9        23 Sep 2017 05:57        CAPILLARY BLOOD GLUCOSE                Radiology personally reviewed:  Discussed with Consultants: Dr. Manuel (ID)  Consultant Notes reviewed: Infectious Disease

## 2017-09-23 NOTE — PROGRESS NOTE ADULT - PROBLEM SELECTOR PLAN 1
Listeria meningitis with confirmed sepsis  -CSF showing normal Glu and elevated Pro more consistent with viral meningitis; however, culture grew Listeria   -Pt on Amp/gent as per ID for synergy  -Monitor fever curve, WBC, gent trough before 4th dose per ID, Trough 1 today  -Audiology: normal baseline hearing  -f/u with ID, no changes for now  -Tylenol PRN pain, zofran PRN nausea

## 2017-09-24 LAB
ANION GAP SERPL CALC-SCNC: 11 MMOL/L — SIGNIFICANT CHANGE UP (ref 5–17)
BUN SERPL-MCNC: 4 MG/DL — LOW (ref 7–23)
CALCIUM SERPL-MCNC: 9.2 MG/DL — SIGNIFICANT CHANGE UP (ref 8.4–10.5)
CHLORIDE SERPL-SCNC: 100 MMOL/L — SIGNIFICANT CHANGE UP (ref 96–108)
CO2 SERPL-SCNC: 27 MMOL/L — SIGNIFICANT CHANGE UP (ref 22–31)
CREAT SERPL-MCNC: 0.67 MG/DL — SIGNIFICANT CHANGE UP (ref 0.5–1.3)
CULTURE RESULTS: SIGNIFICANT CHANGE UP
CULTURE RESULTS: SIGNIFICANT CHANGE UP
GENTAMICIN TROUGH SERPL-MCNC: 1 UG/ML — SIGNIFICANT CHANGE UP (ref 0–2)
GLUCOSE SERPL-MCNC: 91 MG/DL — SIGNIFICANT CHANGE UP (ref 70–99)
HCT VFR BLD CALC: 37.1 % — SIGNIFICANT CHANGE UP (ref 34.5–45)
HGB BLD-MCNC: 12.5 G/DL — SIGNIFICANT CHANGE UP (ref 11.5–15.5)
MCHC RBC-ENTMCNC: 33.3 PG — SIGNIFICANT CHANGE UP (ref 27–34)
MCHC RBC-ENTMCNC: 33.8 GM/DL — SIGNIFICANT CHANGE UP (ref 32–36)
MCV RBC AUTO: 98.6 FL — SIGNIFICANT CHANGE UP (ref 80–100)
PLATELET # BLD AUTO: 378 K/UL — SIGNIFICANT CHANGE UP (ref 150–400)
POTASSIUM SERPL-MCNC: 3.7 MMOL/L — SIGNIFICANT CHANGE UP (ref 3.5–5.3)
POTASSIUM SERPL-SCNC: 3.7 MMOL/L — SIGNIFICANT CHANGE UP (ref 3.5–5.3)
RBC # BLD: 3.77 M/UL — LOW (ref 3.8–5.2)
RBC # FLD: 10.9 % — SIGNIFICANT CHANGE UP (ref 10.3–14.5)
SODIUM SERPL-SCNC: 138 MMOL/L — SIGNIFICANT CHANGE UP (ref 135–145)
SPECIMEN SOURCE: SIGNIFICANT CHANGE UP
SPECIMEN SOURCE: SIGNIFICANT CHANGE UP
WBC # BLD: 8.3 K/UL — SIGNIFICANT CHANGE UP (ref 3.8–10.5)
WBC # FLD AUTO: 8.3 K/UL — SIGNIFICANT CHANGE UP (ref 3.8–10.5)

## 2017-09-24 PROCEDURE — 99233 SBSQ HOSP IP/OBS HIGH 50: CPT | Mod: GC

## 2017-09-24 RX ADMIN — Medication 216 GRAM(S): at 05:42

## 2017-09-24 RX ADMIN — Medication 200 MILLIGRAM(S): at 22:46

## 2017-09-24 RX ADMIN — Medication 216 GRAM(S): at 13:47

## 2017-09-24 RX ADMIN — Medication 200 MILLIGRAM(S): at 06:21

## 2017-09-24 RX ADMIN — Medication 216 GRAM(S): at 10:02

## 2017-09-24 RX ADMIN — Medication 216 GRAM(S): at 21:42

## 2017-09-24 RX ADMIN — Medication 650 MILLIGRAM(S): at 10:00

## 2017-09-24 RX ADMIN — Medication 216 GRAM(S): at 01:50

## 2017-09-24 RX ADMIN — Medication 216 GRAM(S): at 17:56

## 2017-09-24 RX ADMIN — Medication 650 MILLIGRAM(S): at 09:16

## 2017-09-24 RX ADMIN — Medication 1 TABLET(S): at 13:47

## 2017-09-24 RX ADMIN — ONDANSETRON 4 MILLIGRAM(S): 8 TABLET, FILM COATED ORAL at 09:17

## 2017-09-24 RX ADMIN — Medication 200 MILLIGRAM(S): at 14:52

## 2017-09-24 NOTE — PROGRESS NOTE ADULT - PROBLEM SELECTOR PLAN 1
Listeria meningitis with confirmed sepsis  -CSF culture grew Listeria   -Pt on Amp/gent as per ID for synergy  -Monitor fever curve, WBC, gent trough  -f/u with ID, c/w current regimen  -Tylenol PRN pain, zofran PRN nausea

## 2017-09-24 NOTE — PROGRESS NOTE ADULT - SUBJECTIVE AND OBJECTIVE BOX
Patient is a 21y old  Female who presents with a chief complaint of Neck rigidity, headache, fevers (19 Sep 2017 06:12)        SUBJECTIVE / OVERNIGHT EVENTS:    No overnight events, pt had slight headache prior to sleeping, resolved with tylenol. Pt's main complaint is the nausea associated with gentamicin, well controlled with zofran.    MEDICATIONS  (STANDING):  sodium chloride 0.9%. 1000 milliLiter(s) (125 mL/Hr) IV Continuous <Continuous>  ampicillin  IVPB 2 Gram(s) IV Intermittent every 4 hours  acetaminophen  IVPB 1000 milliGRAM(s) IV Intermittent once  gentamicin   IVPB 80 milliGRAM(s) IV Intermittent every 8 hours  lactobacillus acidophilus 1 Tablet(s) Oral daily    MEDICATIONS  (PRN):  acetaminophen   Tablet 650 milliGRAM(s) Oral every 6 hours PRN For Temp greater than 38 C (100.4 F)  ondansetron Injectable 4 milliGRAM(s) IV Push every 8 hours PRN Nausea and/or Vomiting  acetaminophen   Tablet. 650 milliGRAM(s) Oral every 6 hours PRN Mild Pain (1 - 3)      Vital Signs Last 24 Hrs  T(C): 36.7 (24 Sep 2017 05:30), Max: 37.5 (23 Sep 2017 21:38)  T(F): 98 (24 Sep 2017 05:30), Max: 99.5 (23 Sep 2017 21:38)  HR: 96 (24 Sep 2017 05:30) (89 - 98)  BP: 102/71 (24 Sep 2017 05:30) (101/69 - 104/73)  BP(mean): --  RR: 18 (24 Sep 2017 05:30) (18 - 18)  SpO2: 97% (24 Sep 2017 05:30) (97% - 100%)  CAPILLARY BLOOD GLUCOSE        I&O's Summary    23 Sep 2017 07:01  -  24 Sep 2017 07:00  --------------------------------------------------------  IN: 500 mL / OUT: 0 mL / NET: 500 mL          PHYSICAL EXAM  GENERAL: NAD, well-developed  HEAD:  Atraumatic, Normocephalic  EYES: EOMI, PERRLA, conjunctiva and sclera clear  NECK: Supple, No JVD  CHEST/LUNG: Clear to auscultation bilaterally; No wheeze  HEART: Regular rate and rhythm; No murmurs, rubs, or gallops  ABDOMEN: Soft, Nontender, Nondistended; Bowel sounds present  EXTREMITIES:  2+ Peripheral Pulses, No clubbing, cyanosis, or edema  PSYCH: AAOx3  SKIN: No rashes or lesions    LABS:                        12.5   8.3   )-----------( 378      ( 24 Sep 2017 07:30 )             37.1     09-24    138  |  100  |  4<L>  ----------------------------<  91  3.7   |  27  |  0.67    Ca    9.2      24 Sep 2017 07:30                RADIOLOGY & ADDITIONAL TESTS:    Imaging Personally Reviewed: none  Consultant(s) Notes Reviewed:  ID  Care Discussed with Consultants/Other Providers:

## 2017-09-25 LAB
ANION GAP SERPL CALC-SCNC: 11 MMOL/L — SIGNIFICANT CHANGE UP (ref 5–17)
BUN SERPL-MCNC: 5 MG/DL — LOW (ref 7–23)
CALCIUM SERPL-MCNC: 9.3 MG/DL — SIGNIFICANT CHANGE UP (ref 8.4–10.5)
CHLORIDE SERPL-SCNC: 101 MMOL/L — SIGNIFICANT CHANGE UP (ref 96–108)
CO2 SERPL-SCNC: 29 MMOL/L — SIGNIFICANT CHANGE UP (ref 22–31)
CREAT SERPL-MCNC: 0.65 MG/DL — SIGNIFICANT CHANGE UP (ref 0.5–1.3)
GLUCOSE SERPL-MCNC: 108 MG/DL — HIGH (ref 70–99)
HCT VFR BLD CALC: 38.3 % — SIGNIFICANT CHANGE UP (ref 34.5–45)
HGB BLD-MCNC: 13 G/DL — SIGNIFICANT CHANGE UP (ref 11.5–15.5)
INR BLD: 1.05 RATIO — SIGNIFICANT CHANGE UP (ref 0.88–1.16)
MCHC RBC-ENTMCNC: 33.6 PG — SIGNIFICANT CHANGE UP (ref 27–34)
MCHC RBC-ENTMCNC: 33.9 GM/DL — SIGNIFICANT CHANGE UP (ref 32–36)
MCV RBC AUTO: 99.2 FL — SIGNIFICANT CHANGE UP (ref 80–100)
PLATELET # BLD AUTO: 371 K/UL — SIGNIFICANT CHANGE UP (ref 150–400)
POTASSIUM SERPL-MCNC: 3.9 MMOL/L — SIGNIFICANT CHANGE UP (ref 3.5–5.3)
POTASSIUM SERPL-SCNC: 3.9 MMOL/L — SIGNIFICANT CHANGE UP (ref 3.5–5.3)
PROTHROM AB SERPL-ACNC: 11.4 SEC — SIGNIFICANT CHANGE UP (ref 9.8–12.7)
RBC # BLD: 3.86 M/UL — SIGNIFICANT CHANGE UP (ref 3.8–5.2)
RBC # FLD: 11.1 % — SIGNIFICANT CHANGE UP (ref 10.3–14.5)
SODIUM SERPL-SCNC: 141 MMOL/L — SIGNIFICANT CHANGE UP (ref 135–145)
WBC # BLD: 6.5 K/UL — SIGNIFICANT CHANGE UP (ref 3.8–10.5)
WBC # FLD AUTO: 6.5 K/UL — SIGNIFICANT CHANGE UP (ref 3.8–10.5)

## 2017-09-25 PROCEDURE — 99233 SBSQ HOSP IP/OBS HIGH 50: CPT | Mod: GC

## 2017-09-25 PROCEDURE — 99232 SBSQ HOSP IP/OBS MODERATE 35: CPT

## 2017-09-25 PROCEDURE — 71010: CPT | Mod: 26

## 2017-09-25 RX ORDER — GENTAMICIN SULFATE 40 MG/ML
40 VIAL (ML) INJECTION
Qty: 42 | Refills: 0 | OUTPATIENT
Start: 2017-09-25 | End: 2017-10-09

## 2017-09-25 RX ORDER — ONDANSETRON 8 MG/1
1 TABLET, FILM COATED ORAL
Qty: 42 | Refills: 0 | OUTPATIENT
Start: 2017-09-25 | End: 2017-10-09

## 2017-09-25 RX ORDER — AMPICILLIN TRIHYDRATE 250 MG
2 CAPSULE ORAL
Qty: 84 | Refills: 0 | OUTPATIENT
Start: 2017-09-25 | End: 2017-10-09

## 2017-09-25 RX ADMIN — Medication 216 GRAM(S): at 15:37

## 2017-09-25 RX ADMIN — Medication 216 GRAM(S): at 02:13

## 2017-09-25 RX ADMIN — Medication 216 GRAM(S): at 06:24

## 2017-09-25 RX ADMIN — Medication 200 MILLIGRAM(S): at 08:10

## 2017-09-25 RX ADMIN — Medication 200 MILLIGRAM(S): at 13:17

## 2017-09-25 RX ADMIN — Medication 1 TABLET(S): at 11:13

## 2017-09-25 RX ADMIN — Medication 216 GRAM(S): at 23:01

## 2017-09-25 RX ADMIN — Medication 216 GRAM(S): at 10:15

## 2017-09-25 RX ADMIN — Medication 216 GRAM(S): at 17:51

## 2017-09-25 RX ADMIN — Medication 200 MILLIGRAM(S): at 21:46

## 2017-09-25 NOTE — PROGRESS NOTE ADULT - SUBJECTIVE AND OBJECTIVE BOX
CC: F/U for Listeria Meningitis    Saw/spoke to patient. No fevers, no chills, no new complaints. Overall improved--no further headaches, no meningismus. No signs auditory toxicity.    Allergies  No Known Allergies    ANTIMICROBIALS:  ampicillin  IVPB 2 every 4 hours  gentamicin   IVPB 80 every 8 hours      PE:    VS: Afebrile x 24, no other vitals available  Gen: AOx3, NAD, non-toxic, pleasant  CV: S1+S2 normal, no murmurs, nontachycardic  Resp: Clear bilat, no resp distress, no crackles/wheezes  Abd: Soft, nontender, +BS  Ext: No LE edema, no wounds  : No [place]  IV/Skin: No thrombophlebitis    LABS:                        13.0   6.5   )-----------( 371      ( 25 Sep 2017 10:43 )             38.3     09-25    141  |  101  |  5<L>  ----------------------------<  108<H>  3.9   |  29  |  0.65    Ca    9.3      25 Sep 2017 10:43    MICROBIOLOGY:    No new available (prior reviewed)    HSV 1/2 PCR: Trini (09-19 @ 04:38)  Rapid RVP Result: Trini (09-18 @ 22:06)    RADIOLOGY:    None available

## 2017-09-25 NOTE — PROGRESS NOTE ADULT - PROBLEM SELECTOR PROBLEM 4
Migraine
Need for prophylactic measure
Migraine
Migraine
Need for prophylactic measure
Migraine
Migraine

## 2017-09-25 NOTE — PROGRESS NOTE ADULT - ASSESSMENT
22 yo F with PMHx of migraines presenting headache and Fever found to be septic with concern for viral vs. bacterial meningitis s/p LP, culture gram positive rods, Listeria. Pt currently on Ampicillin, gentamicin.

## 2017-09-25 NOTE — PROGRESS NOTE ADULT - PROBLEM SELECTOR PLAN 1
Listeria meningitis with confirmed sepsis  -CSF culture grew Listeria   -Pt on Amp/gent as per ID for synergy  -Monitor fever curve, WBC, gent trough  -f/u with ID, c/w current regimen. F/u regarding length of treatment   -Tylenol PRN pain, zofran PRN nausea Listeria meningitis with confirmed sepsis  -CSF culture grew Listeria   -Pt on Amp/gent as per ID for synergy  -Monitor fever curve, WBC, gent trough  -f/u with ID, c/w current regimen. Will need PICC placement for IV infusion at home, likely to complete 3 weeks therapy in total.  -Tylenol PRN pain, zofran PRN nausea

## 2017-09-25 NOTE — PROGRESS NOTE ADULT - SUBJECTIVE AND OBJECTIVE BOX
Patient is a 21y old  Female who presents with a chief complaint of Neck rigidity, headache, fevers (19 Sep 2017 06:12)        SUBJECTIVE / OVERNIGHT EVENTS: No acute events overnight. Pt had slight headache resolved after Tylenol Denies any CP, SOB, fevers, chills.       MEDICATIONS  (STANDING):  sodium chloride 0.9%. 1000 milliLiter(s) (125 mL/Hr) IV Continuous <Continuous>  ampicillin  IVPB 2 Gram(s) IV Intermittent every 4 hours  acetaminophen  IVPB 1000 milliGRAM(s) IV Intermittent once  gentamicin   IVPB 80 milliGRAM(s) IV Intermittent every 8 hours  lactobacillus acidophilus 1 Tablet(s) Oral daily    MEDICATIONS  (PRN):  acetaminophen   Tablet 650 milliGRAM(s) Oral every 6 hours PRN For Temp greater than 38 C (100.4 F)  ondansetron Injectable 4 milliGRAM(s) IV Push every 8 hours PRN Nausea and/or Vomiting  acetaminophen   Tablet. 650 milliGRAM(s) Oral every 6 hours PRN Mild Pain (1 - 3)      ICU Vital Signs Last 24 Hrs  T(C): 36.7 (24 Sep 2017 14:09), Max: 36.7 (24 Sep 2017 14:09)  T(F): 98 (24 Sep 2017 14:09), Max: 98 (24 Sep 2017 14:09)  HR: 92 (24 Sep 2017 14:09) (92 - 92)  BP: 100/71 (24 Sep 2017 14:09) (100/71 - 100/71)  BP(mean): --  ABP: --  ABP(mean): --  RR: 18 (24 Sep 2017 14:09) (18 - 18)  SpO2: 99% (24 Sep 2017 14:09) (99% - 99%)            PHYSICAL EXAM  GENERAL: NAD, well-developed  HEAD:  Atraumatic, Normocephalic  EYES: EOMI, PERRLA, conjunctiva and sclera clear  NECK: Supple, No JVD  CHEST/LUNG: Clear to auscultation bilaterally; No wheeze  HEART: Regular rate and rhythm; No murmurs, rubs, or gallops  ABDOMEN: Soft, Nontender, Nondistended; Bowel sounds present  EXTREMITIES:  2+ Peripheral Pulses, No clubbing, cyanosis, or edema  PSYCH: AAOx3  SKIN: No rashes or lesions    LABS:                           RADIOLOGY & ADDITIONAL TESTS:    Imaging Personally Reviewed: none  Consultant(s) Notes Reviewed:    Care Discussed with Consultants/Other Providers: Patient is a 21y old  Female who presents with a chief complaint of Neck rigidity, headache, fevers (19 Sep 2017 06:12)        SUBJECTIVE / OVERNIGHT EVENTS: No acute events overnight. Pt had slight headache resolved after Tylenol Denies any CP, SOB, fevers, chills.       MEDICATIONS  (STANDING):  sodium chloride 0.9%. 1000 milliLiter(s) (125 mL/Hr) IV Continuous <Continuous>  ampicillin  IVPB 2 Gram(s) IV Intermittent every 4 hours  acetaminophen  IVPB 1000 milliGRAM(s) IV Intermittent once  gentamicin   IVPB 80 milliGRAM(s) IV Intermittent every 8 hours  lactobacillus acidophilus 1 Tablet(s) Oral daily    MEDICATIONS  (PRN):  acetaminophen   Tablet 650 milliGRAM(s) Oral every 6 hours PRN For Temp greater than 38 C (100.4 F)  ondansetron Injectable 4 milliGRAM(s) IV Push every 8 hours PRN Nausea and/or Vomiting  acetaminophen   Tablet. 650 milliGRAM(s) Oral every 6 hours PRN Mild Pain (1 - 3)      ICU Vital Signs Last 24 Hrs  T(C): 36.7 (24 Sep 2017 14:09), Max: 36.7 (24 Sep 2017 14:09)  T(F): 98 (24 Sep 2017 14:09), Max: 98 (24 Sep 2017 14:09)  HR: 92 (24 Sep 2017 14:09) (92 - 92)  BP: 100/71 (24 Sep 2017 14:09) (100/71 - 100/71)  BP(mean): --  ABP: --  ABP(mean): --  RR: 18 (24 Sep 2017 14:09) (18 - 18)  SpO2: 99% (24 Sep 2017 14:09) (99% - 99%)            PHYSICAL EXAM  GENERAL: NAD, well-developed  HEAD:  Atraumatic, Normocephalic  EYES: EOMI, PERRLA, conjunctiva and sclera clear  NECK: Supple, No JVD  CHEST/LUNG: Clear to auscultation bilaterally; No wheeze  HEART: Regular rate and rhythm; No murmurs, rubs, or gallops  ABDOMEN: Soft, Nontender, Nondistended; Bowel sounds present  EXTREMITIES:  2+ Peripheral Pulses, No clubbing, cyanosis, or edema  PSYCH: AAOx3  SKIN: No rashes or lesions    LABS:  Basic Metabolic Panel (09.25.17 @ 10:43)    Sodium, Serum: 141 mmol/L    Potassium, Serum: 3.9 mmol/L    Chloride, Serum: 101 mmol/L    Carbon Dioxide, Serum: 29 mmol/L    Anion Gap, Serum: 11 mmol/L    Blood Urea Nitrogen, Serum: 5 mg/dL    Creatinine, Serum: 0.65 mg/dL    Glucose, Serum: 108 mg/dL    Calcium, Total Serum: 9.3 mg/dL        RADIOLOGY & ADDITIONAL TESTS:    Imaging Personally Reviewed: none  Consultant(s) Notes Reviewed:    Care Discussed with Consultants/Other Providers: TOMEKA Manuel re: antibiotic regimen

## 2017-09-25 NOTE — PROGRESS NOTE ADULT - ASSESSMENT
22yo F with PMH of Migraines (attacks 2-3 x year, respond to excedrin) presenting HA and fever x1 day. Patient with HA, neck stiffness, photophobia. CSF positive with high cells, mixed neutrophils/lymphs. Protein/glucose not particularly abnormal. Initial culture now with Listeria. Uncertain cause for primary listeria meningitis. No signs immunocompromise. Patient now significantly improved on present regimen. Overall duration uncertain, likely treat for 2-4 weeks for CNS infection; Gent for 1-2 weeks (depending on tolerability).  - Amp 2g q 4  - Gent 80 q 8 (Continue for now, 1-2 weeks?)  - Consider MRI Brain if worsening  - Appreciate Audiology eval  - Okay to place PICC, would place double lumen PICC  - Discussed case with [facility]--can use an Amp pump that's needs to be re-filled q 24  - For now, would plan to discharge on both Amp and Gent    Mika Manuel MD  Pager [phone]  After 5pm and on weekends call [phone]

## 2017-09-25 NOTE — PROGRESS NOTE ADULT - ATTENDING COMMENTS
Continues to improve. Plan for PICC placement either today or tomorrow. Will f/u with ID and case management re: home antibiotic infusion of ampicillin and gentamicin. Continue to monitor closely for toxicity.    Discussed with patient and mother at bedside.

## 2017-09-26 ENCOUNTER — TRANSCRIPTION ENCOUNTER (OUTPATIENT)
Age: 22
End: 2017-09-26

## 2017-09-26 VITALS
DIASTOLIC BLOOD PRESSURE: 68 MMHG | SYSTOLIC BLOOD PRESSURE: 93 MMHG | TEMPERATURE: 98 F | RESPIRATION RATE: 16 BRPM | OXYGEN SATURATION: 99 % | HEART RATE: 89 BPM

## 2017-09-26 PROCEDURE — 84157 ASSAY OF PROTEIN OTHER: CPT

## 2017-09-26 PROCEDURE — 86592 SYPHILIS TEST NON-TREP QUAL: CPT

## 2017-09-26 PROCEDURE — 86788 WEST NILE VIRUS AB IGM: CPT

## 2017-09-26 PROCEDURE — 86480 TB TEST CELL IMMUN MEASURE: CPT

## 2017-09-26 PROCEDURE — 71045 X-RAY EXAM CHEST 1 VIEW: CPT

## 2017-09-26 PROCEDURE — 82945 GLUCOSE OTHER FLUID: CPT

## 2017-09-26 PROCEDURE — 62270 DX LMBR SPI PNXR: CPT

## 2017-09-26 PROCEDURE — 96375 TX/PRO/DX INJ NEW DRUG ADDON: CPT | Mod: XU

## 2017-09-26 PROCEDURE — 99239 HOSP IP/OBS DSCHRG MGMT >30: CPT

## 2017-09-26 PROCEDURE — 87486 CHLMYD PNEUM DNA AMP PROBE: CPT

## 2017-09-26 PROCEDURE — 89051 BODY FLUID CELL COUNT: CPT

## 2017-09-26 PROCEDURE — 87040 BLOOD CULTURE FOR BACTERIA: CPT

## 2017-09-26 PROCEDURE — 80048 BASIC METABOLIC PNL TOTAL CA: CPT

## 2017-09-26 PROCEDURE — 80170 ASSAY OF GENTAMICIN: CPT

## 2017-09-26 PROCEDURE — 99285 EMERGENCY DEPT VISIT HI MDM: CPT | Mod: 25

## 2017-09-26 PROCEDURE — 81001 URINALYSIS AUTO W/SCOPE: CPT

## 2017-09-26 PROCEDURE — 87070 CULTURE OTHR SPECIMN AEROBIC: CPT

## 2017-09-26 PROCEDURE — 85027 COMPLETE CBC AUTOMATED: CPT

## 2017-09-26 PROCEDURE — 99232 SBSQ HOSP IP/OBS MODERATE 35: CPT

## 2017-09-26 PROCEDURE — 87116 MYCOBACTERIA CULTURE: CPT

## 2017-09-26 PROCEDURE — 96374 THER/PROPH/DIAG INJ IV PUSH: CPT | Mod: XU

## 2017-09-26 PROCEDURE — 87476 LYME DIS DNA AMP PROBE: CPT

## 2017-09-26 PROCEDURE — 83615 LACTATE (LD) (LDH) ENZYME: CPT

## 2017-09-26 PROCEDURE — 87102 FUNGUS ISOLATION CULTURE: CPT

## 2017-09-26 PROCEDURE — 87798 DETECT AGENT NOS DNA AMP: CPT

## 2017-09-26 PROCEDURE — 80053 COMPREHEN METABOLIC PANEL: CPT

## 2017-09-26 PROCEDURE — 87086 URINE CULTURE/COLONY COUNT: CPT

## 2017-09-26 PROCEDURE — 36569 INSJ PICC 5 YR+ W/O IMAGING: CPT

## 2017-09-26 PROCEDURE — 86403 PARTICLE AGGLUT ANTBDY SCRN: CPT

## 2017-09-26 PROCEDURE — 87529 HSV DNA AMP PROBE: CPT

## 2017-09-26 PROCEDURE — 87389 HIV-1 AG W/HIV-1&-2 AB AG IA: CPT

## 2017-09-26 PROCEDURE — 81025 URINE PREGNANCY TEST: CPT

## 2017-09-26 PROCEDURE — 87483 CNS DNA AMP PROBE TYPE 12-25: CPT

## 2017-09-26 PROCEDURE — C1751: CPT

## 2017-09-26 PROCEDURE — 85610 PROTHROMBIN TIME: CPT

## 2017-09-26 PROCEDURE — 87205 SMEAR GRAM STAIN: CPT

## 2017-09-26 PROCEDURE — 87633 RESP VIRUS 12-25 TARGETS: CPT

## 2017-09-26 PROCEDURE — 87581 M.PNEUMON DNA AMP PROBE: CPT

## 2017-09-26 PROCEDURE — 86789 WEST NILE VIRUS ANTIBODY: CPT

## 2017-09-26 PROCEDURE — 92552 PURE TONE AUDIOMETRY AIR: CPT

## 2017-09-26 RX ORDER — ONDANSETRON 8 MG/1
1 TABLET, FILM COATED ORAL
Qty: 42 | Refills: 0 | OUTPATIENT
Start: 2017-09-26 | End: 2017-10-10

## 2017-09-26 RX ADMIN — Medication 216 GRAM(S): at 06:18

## 2017-09-26 RX ADMIN — ONDANSETRON 4 MILLIGRAM(S): 8 TABLET, FILM COATED ORAL at 09:22

## 2017-09-26 RX ADMIN — Medication 216 GRAM(S): at 02:51

## 2017-09-26 RX ADMIN — Medication 216 GRAM(S): at 09:25

## 2017-09-26 RX ADMIN — Medication 200 MILLIGRAM(S): at 06:18

## 2017-09-26 RX ADMIN — Medication 1 TABLET(S): at 11:52

## 2017-09-26 NOTE — DIETITIAN INITIAL EVALUATION ADULT. - NS AS NUTRI INTERV ED CONTENT
Discussed food safety and proper handling of food./Purpose of the nutrition education/Nutrition relationship to health/disease

## 2017-09-26 NOTE — DISCHARGE NOTE ADULT - PATIENT PORTAL LINK FT
“You can access the [facility] Patient Portal, offered by [facility], by registering with the following website: [email or web address]”

## 2017-09-26 NOTE — PROGRESS NOTE ADULT - ASSESSMENT
20yo F with PMH of Migraines (attacks 2-3 x year, respond to excedrin) presenting HA and fever x1 day. Patient with HA, neck stiffness, photophobia. CSF positive with high cells, mixed neutrophils/lymphs. Protein/glucose not particularly abnormal. Initial culture now with Listeria. Uncertain cause for primary listeria meningitis. No signs immunocompromise. Patient now significantly improved on present regimen. Overall duration uncertain, likely treat for 2-4 weeks for CNS infection; Gent for 1-2 weeks (depending on tolerability). Overall well, s/p PICC planned for DC home.  - Amp 2g q 4  - Gent 80 q 8 (Continue for now, 1-2 weeks?)  - Appreciate Audiology eval  - Discussed case with [facility]--can use an Amp pump that's needs to be re-filled q 24  - Patient given my card to contact me with any concerns or questions; advised if has any problems with balance or hearing to call me immediately--hold gentamicin. Patient to follow up in my clinic 1 weeks after completing antibiotics.    Mika Manuel MD  Pager [phone]  After 5pm and on weekends call [phone]

## 2017-09-26 NOTE — DIETITIAN INITIAL EVALUATION ADULT. - OTHER INFO
Pt seen for length of stay. Pt reports , no recent changes. Pt with no chewing/swallowing difficulties, no GI distress. Appetite is good, eating well. Pt concerned about what to eat when discharged due to listeria.

## 2017-09-26 NOTE — DIETITIAN INITIAL EVALUATION ADULT. - ENERGY NEEDS
ht: 63 inches, wt: 110 pounds, BMI: 19.5 kg/m2, IBW: 115 pounds (+/- 10%), 96 %IBW  Edema: 1+ flo foot. Skin: intact.  Other pertinent information: 22 y/o female with PMHx of migraines presented with headache and Fever found to be septic with concern for viral vs. bacterial meningitis s/p LP, culture gram positive rods, Listeria. Pt currently on Ampicillin, gentamicin. S/p PICC, likely discharge today.

## 2017-09-26 NOTE — PROGRESS NOTE ADULT - SUBJECTIVE AND OBJECTIVE BOX
CC: F/U for Meningitis    Saw/spoke to patient. No fevers, no chills, no new complaints, overall well. S/p PICC, planned for DC home today.    Allergies  No Known Allergies    ANTIMICROBIALS:  ampicillin  IVPB 2 every 4 hours  gentamicin   IVPB 80 every 8 hours    PE:    Vital Signs Last 24 Hrs  T(C): 36.7 (26 Sep 2017 12:49), Max: 36.9 (25 Sep 2017 21:36)  T(F): 98.1 (26 Sep 2017 12:49), Max: 98.4 (25 Sep 2017 21:36)  HR: 89 (26 Sep 2017 12:49) (84 - 97)  BP: 93/68 (26 Sep 2017 12:49) (93/68 - 105/72)  BP(mean): --  RR: 16 (26 Sep 2017 12:49) (16 - 18)  SpO2: 99% (26 Sep 2017 12:49) (97% - 100%)    Gen: AOx3, NAD, non-toxic, pleasant  CV: S1+S2 normal, no murmurs, nontachycardic  Resp: Clear bilat, no resp distress, no crackles/wheezes  Abd: Soft, nontender, +BS  Ext: RUE PICC    LABS:                        13.0   6.5   )-----------( 371      ( 25 Sep 2017 10:43 )             38.3     09-25    141  |  101  |  5<L>  ----------------------------<  108<H>  3.9   |  29  |  0.65    Ca    9.3      25 Sep 2017 10:43    MICROBIOLOGY:    No new available    RADIOLOGY:    9/25 CXR:    IMPRESSION:   Clear lungs.   Right-sided PICC line with tip within the SVC.

## 2017-09-26 NOTE — DISCHARGE NOTE ADULT - ADDITIONAL INSTRUCTIONS
The infectious disease clinic will contact you at your home number for follow appointment with Dr. Manuel

## 2017-09-26 NOTE — DIETITIAN INITIAL EVALUATION ADULT. - PROBLEM SELECTOR PLAN 1
-CSF showing normal Glu and elevated Pro more consistent with viral meningitis; Gram Stain w/o organisms but many WBC's  -c/w Acyclovir for viral meningitis; if viral more likely an Entero virus, f/u HSV PCR but in immunocompetent pts HSV2 more likely over HSV1 but pt denies any genital lesions, UTD on vaccines  -c/w empiric bacterial meningitis coverage for now: Vanc/CTX  -no seizures, neuro deficit to suggest encephalitis  -droplet precautions  -appreciate Neuro recs

## 2017-09-26 NOTE — PROGRESS NOTE ADULT - PROBLEM SELECTOR PLAN 1
Listeria meningitis with confirmed sepsis  -CSF culture grew Listeria   -Pt on Amp/gent as per ID for synergy  -Monitor fever curve, WBC, gent trough  -f/u with ID, c/w current regimen. S/p for IV infusion at home, likely to complete 3 weeks therapy in total.  -Tylenol PRN pain, zofran PRN nausea

## 2017-09-26 NOTE — PROGRESS NOTE ADULT - ATTENDING COMMENTS
S/P PICC placement yesterday, tolerating infusions well. Stable for discharge home today on amp/gent with infusion device. To f/u with Dr. Manuel as outpatient.    Discharge time: 35 minutes

## 2017-09-26 NOTE — DISCHARGE NOTE ADULT - CARE PLAN
Principal Discharge DX:	Meningitis  Goal:	To treat the infection  Instructions for follow-up, activity and diet:	Please continue taking the ampicillin and gentamicin until 10/11/17. The infectious disease clinic will contact you directly for the appointment time and date with Dr. Manuel.  If you experience worsening headache not relieved by Tylenol, fever, chills, neck stiffness, please come to the Emergency room.  Secondary Diagnosis:	Migraine

## 2017-09-26 NOTE — DISCHARGE NOTE ADULT - MEDICATION SUMMARY - MEDICATIONS TO TAKE
I will START or STAY ON the medications listed below when I get home from the hospital:    CBC, CMP, gentamycin trough once every week  -- Indication: For Monitoring while on Antibiotics     gentamicin 100 mg/50 mL-NaCl 0.9% intravenous solution  -- 40 milliliter(s) intravenous every 8 hours  End date: 10/11/17  -- Indication: For Antibiotic for meningtitis     Excedrin oral tablet  -- 2 tab(s) by mouth every 6 hours, As Needed  -- Indication: For Migraine    Zofran 4 mg oral tablet  -- 1 tab(s) by mouth every 8 hours, As Needed for nausea  -- Indication: For Nausea     ampicillin 2 g injection  -- 2 gram(s) injectable every 4 hours   End date:10/11/17  -- Indication: For Antibiotic for meningtitis

## 2017-09-26 NOTE — PROGRESS NOTE ADULT - ASSESSMENT
20 yo F with PMHx of migraines presenting headache and Fever found to be septic with concern for viral vs. bacterial meningitis s/p LP, culture gram positive rods, Listeria. Pt currently on Ampicillin, gentamicin. S/p PICC, likely discharge today.

## 2017-09-26 NOTE — PROGRESS NOTE ADULT - SUBJECTIVE AND OBJECTIVE BOX
Patient is a 21y old  Female who presents with a chief complaint of Neck rigidity, headache, fevers (19 Sep 2017 06:12)      SUBJECTIVE / OVERNIGHT EVENTS: No acute events overnight. C/o some soreness at PICC site. Denies any headache, CP, SOB, fevers, chills.       MEDICATIONS  (STANDING):  sodium chloride 0.9%. 1000 milliLiter(s) (125 mL/Hr) IV Continuous <Continuous>  ampicillin  IVPB 2 Gram(s) IV Intermittent every 4 hours  acetaminophen  IVPB 1000 milliGRAM(s) IV Intermittent once  gentamicin   IVPB 80 milliGRAM(s) IV Intermittent every 8 hours  lactobacillus acidophilus 1 Tablet(s) Oral daily    MEDICATIONS  (PRN):  acetaminophen   Tablet 650 milliGRAM(s) Oral every 6 hours PRN For Temp greater than 38 C (100.4 F)  ondansetron Injectable 4 milliGRAM(s) IV Push every 8 hours PRN Nausea and/or Vomiting  acetaminophen   Tablet. 650 milliGRAM(s) Oral every 6 hours PRN Mild Pain (1 - 3)      ICU Vital Signs Last 24 Hrs  T(C): 36.7 (24 Sep 2017 14:09), Max: 36.7 (24 Sep 2017 14:09)  T(F): 98 (24 Sep 2017 14:09), Max: 98 (24 Sep 2017 14:09)  HR: 92 (24 Sep 2017 14:09) (92 - 92)  BP: 100/71 (24 Sep 2017 14:09) (100/71 - 100/71)  BP(mean): --  ABP: --  ABP(mean): --  RR: 18 (24 Sep 2017 14:09) (18 - 18)  SpO2: 99% (24 Sep 2017 14:09) (99% - 99%)            PHYSICAL EXAM  GENERAL: NAD, well-developed  HEAD:  Atraumatic, Normocephalic  EYES: EOMI, PERRLA, conjunctiva and sclera clear  NECK: Supple, No JVD, neg stiffness   CHEST/LUNG: Clear to auscultation bilaterally; No wheeze  HEART: Regular rate and rhythm; No murmurs, rubs, or gallops  ABDOMEN: Soft, Nontender, Nondistended; Bowel sounds present  EXTREMITIES:  2+ Peripheral Pulses, No clubbing, cyanosis, or edema  PSYCH: AAOx3  SKIN: No rashes or lesions    LABS:  Basic Metabolic Panel (09.25.17 @ 10:43)    Sodium, Serum: 141 mmol/L    Potassium, Serum: 3.9 mmol/L    Chloride, Serum: 101 mmol/L    Carbon Dioxide, Serum: 29 mmol/L    Anion Gap, Serum: 11 mmol/L    Blood Urea Nitrogen, Serum: 5 mg/dL    Creatinine, Serum: 0.65 mg/dL    Glucose, Serum: 108 mg/dL    Calcium, Total Serum: 9.3 mg/dL        RADIOLOGY & ADDITIONAL TESTS:    Imaging Personally Reviewed: none  Consultant(s) Notes Reviewed:  ID  Care Discussed with Consultants/Other Providers: Patient is a 21y old  Female who presents with a chief complaint of Neck rigidity, headache, fevers (19 Sep 2017 06:12)      SUBJECTIVE / OVERNIGHT EVENTS: No acute events overnight. C/o some soreness at PICC site. Denies any headache, CP, SOB, fevers, chills.       MEDICATIONS  (STANDING):  sodium chloride 0.9%. 1000 milliLiter(s) (125 mL/Hr) IV Continuous <Continuous>  ampicillin  IVPB 2 Gram(s) IV Intermittent every 4 hours  acetaminophen  IVPB 1000 milliGRAM(s) IV Intermittent once  gentamicin   IVPB 80 milliGRAM(s) IV Intermittent every 8 hours  lactobacillus acidophilus 1 Tablet(s) Oral daily    MEDICATIONS  (PRN):  acetaminophen   Tablet 650 milliGRAM(s) Oral every 6 hours PRN For Temp greater than 38 C (100.4 F)  ondansetron Injectable 4 milliGRAM(s) IV Push every 8 hours PRN Nausea and/or Vomiting  acetaminophen   Tablet. 650 milliGRAM(s) Oral every 6 hours PRN Mild Pain (1 - 3)        PHYSICAL EXAM  GENERAL: NAD, well-developed  HEAD:  Atraumatic, Normocephalic  EYES: EOMI, PERRLA, conjunctiva and sclera clear  NECK: Supple, No JVD, neg stiffness   CHEST/LUNG: Clear to auscultation bilaterally; No wheeze  HEART: Regular rate and rhythm; No murmurs, rubs, or gallops  ABDOMEN: Soft, Nontender, Nondistended; Bowel sounds present  EXTREMITIES:  2+ Peripheral Pulses, No clubbing, cyanosis, or edema  PSYCH: AAOx3  SKIN: No rashes or lesions    LABS:  Basic Metabolic Panel (09.25.17 @ 10:43)    Sodium, Serum: 141 mmol/L    Potassium, Serum: 3.9 mmol/L    Chloride, Serum: 101 mmol/L    Carbon Dioxide, Serum: 29 mmol/L    Anion Gap, Serum: 11 mmol/L    Blood Urea Nitrogen, Serum: 5 mg/dL    Creatinine, Serum: 0.65 mg/dL    Glucose, Serum: 108 mg/dL    Calcium, Total Serum: 9.3 mg/dL        RADIOLOGY & ADDITIONAL TESTS:    Imaging Personally Reviewed: CXR, PICC in SVC   Consultant(s) Notes Reviewed:  ID  Care Discussed with Consultants/Other Providers:

## 2017-09-26 NOTE — DIETITIAN INITIAL EVALUATION ADULT. - PROBLEM SELECTOR PLAN 5
-DVT PPX: Lovenox until ambulatory  -Childbearing Age: check Urine Pregnancy          Khurram Guy M.D.  Medicine Resident, PGY-2  Pager: [phone]

## 2017-09-26 NOTE — PROGRESS NOTE ADULT - PROVIDER SPECIALTY LIST ADULT
Infectious Disease
Internal Medicine
Neurology
Internal Medicine

## 2017-09-26 NOTE — DISCHARGE NOTE ADULT - HOSPITAL COURSE
22yo F with PMH of Migraines presenting HA and fever x1 day. Patient states MONTES DE OCA started last night and has progressively worsened over time. HA described as posterior, throbbing; pt states this HA is not typical of her migraines. She endorses subjective fevers but did not take her temp. HA and fevers are associated with neck and back pain. Pt endorses mild photophobia but denies any other visual changes. She took motrin last night without any relief. Was evaluate [facility] an [facility], found to have a fever and was advised to present to the ED. Denies URI symptoms, CP, SOB, N/V, dysuria, genital lesions, rash, numbness/tingling, or joint pain. 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 She reports recent travel, returned from [place] two weeks ago after a 2 week stay but did not have any symptoms there. Patient denies any other camping or bug bites. She denies substance abuse or new sexual encounters.    In the ED, pt was febrile to 101.1, /75, tachycardic to 128, and satting well on RA. She underwent an LP and received Vanc/CTX, Toradol, and 1L bolus.    The patient got an LP in the ED and findings resembled aseptic meningitis, as the protein was elevated, but glucose normal. The patient was continued on vanc/CTX, and acyclovir until the CSF PCR came back as listeria. The 22yo F with PMH of Migraines presenting HA and fever x1 day. Patient states MONTES DE OCA started last night and has progressively worsened over time. HA described as posterior, throbbing; pt states this HA is not typical of her migraines. She endorses subjective fevers but did not take her temp. HA and fevers are associated with neck and back pain. Pt endorses mild photophobia but denies any other visual changes. She took motrin last night without any relief. Was evaluate [facility] an [facility], found to have a fever and was advised to present to the ED. Denies URI symptoms, CP, SOB, N/V, dysuria, genital lesions, rash, numbness/tingling, or joint pain. 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 She reports recent travel, returned from [place] two weeks ago after a 2 week stay but did not have any symptoms there. Patient denies any other camping or bug bites. She denies substance abuse or new sexual encounters.    In the ED, pt was febrile to 101.1, /75, tachycardic to 128, and satting well on RA. She underwent an LP and received Vanc/CTX, Toradol, and 1L bolus.    The patient got an LP in the ED and findings resembled aseptic meningitis, as the protein was elevated, but glucose normal. The patient was continued on vanc/CTX, and acyclovir until the CSF PCR came back as listeria. ID was consulted and the patient was started on Amp/Gent. The patient improved on therapy and stopped having fevers, while leukocytosis resolved. The pt had headaches, but relieved well with Tylenol. Blood Cx were neg, and HIV, QuantiFeron were neg for any immunocompromised state. Pt remained hemodynamically stable throughout the course.     The patient has improved, stable and is ready for discharge. She will continue taking Amp/Gent to complete a total 3 week course of listeria meningitis. She will follow up with Dr. Manuel in the office.

## 2017-09-26 NOTE — DISCHARGE NOTE ADULT - PLAN OF CARE
To treat the infection Please continue taking the ampicillin and gentamicin until 10/11/17. The infectious disease clinic will contact you directly for the appointment time and date with Dr. Manuel.  If you experience worsening headache not relieved by Tylenol, fever, chills, neck stiffness, please come to the Emergency room.

## 2017-09-26 NOTE — PROGRESS NOTE ADULT - PROBLEM SELECTOR PLAN 2
-Tylenol PRN; avoid NSAIDs while on gentamicin to limit nephrotoxicity risk  -headaches responding to Tylenol, intermittent

## 2017-09-26 NOTE — PROGRESS NOTE ADULT - PROBLEM SELECTOR PLAN 3
-likely starvation ketosis in setting of decreased PO intake during acute illness  -encourage PO as tolerated
-Tylenol PRN; avoid NSAIDs while on gentamicin to limit nephrotoxicity risk  -headaches responding to Tylenol, intermittent
-IMPROVE score 0.4% VTE, no pharm ppx needed   -pt encouraged to ambulate
-Tylenol PRN; avoid NSAIDs while on gentamicin to limit nephrotoxicity risk
-likely starvation ketosis in setting of decreased PO intake during acute illness  -encourage PO as tolerated

## 2017-09-26 NOTE — DISCHARGE NOTE ADULT - CARE PROVIDER_API CALL
Mika Manuel), Infectious Disease; Internal Medicine  [address] [place] [address]  Phone: [phone]  Fax: [phone]

## 2017-09-27 ENCOUNTER — TRANSCRIPTION ENCOUNTER (OUTPATIENT)
Age: 22
End: 2017-09-27

## 2017-09-27 PROBLEM — G43.909 MIGRAINE, UNSPECIFIED, NOT INTRACTABLE, WITHOUT STATUS MIGRAINOSUS: Chronic | Status: ACTIVE | Noted: 2017-09-18

## 2017-09-28 PROBLEM — Z00.00 ENCOUNTER FOR PREVENTIVE HEALTH EXAMINATION: Status: ACTIVE | Noted: 2017-09-28

## 2017-09-29 ENCOUNTER — OTHER (OUTPATIENT)
Age: 22
End: 2017-09-29

## 2017-10-18 LAB
CULTURE RESULTS: SIGNIFICANT CHANGE UP
SPECIMEN SOURCE: SIGNIFICANT CHANGE UP

## 2017-10-19 ENCOUNTER — APPOINTMENT (OUTPATIENT)
Dept: INFECTIOUS DISEASE | Facility: CLINIC | Age: 22
End: 2017-10-19
Payer: COMMERCIAL

## 2017-10-19 VITALS
RESPIRATION RATE: 16 BRPM | OXYGEN SATURATION: 99 % | HEIGHT: 60 IN | WEIGHT: 106 LBS | HEART RATE: 84 BPM | SYSTOLIC BLOOD PRESSURE: 115 MMHG | DIASTOLIC BLOOD PRESSURE: 82 MMHG | TEMPERATURE: 98.1 F | BODY MASS INDEX: 20.81 KG/M2

## 2017-10-19 DIAGNOSIS — G43.909 MIGRAINE, UNSPECIFIED, NOT INTRACTABLE, W/OUT STATUS MIGRAINOSUS: ICD-10-CM

## 2017-10-19 DIAGNOSIS — A32.11: ICD-10-CM

## 2017-10-19 PROCEDURE — 99214 OFFICE O/P EST MOD 30 MIN: CPT

## 2017-10-20 LAB
ALBUMIN SERPL ELPH-MCNC: 4.5 G/DL
ALP BLD-CCNC: 34 U/L
ALT SERPL-CCNC: 59 U/L
ANION GAP SERPL CALC-SCNC: 15 MMOL/L
AST SERPL-CCNC: 43 U/L
BASOPHILS # BLD AUTO: 0.02 K/UL
BASOPHILS NFR BLD AUTO: 0.4 %
BILIRUB SERPL-MCNC: <0.2 MG/DL
BUN SERPL-MCNC: 8 MG/DL
CALCIUM SERPL-MCNC: 10.3 MG/DL
CHLORIDE SERPL-SCNC: 98 MMOL/L
CO2 SERPL-SCNC: 23 MMOL/L
CREAT SERPL-MCNC: 0.96 MG/DL
EOSINOPHIL # BLD AUTO: 0.06 K/UL
EOSINOPHIL NFR BLD AUTO: 1.1 %
GLUCOSE SERPL-MCNC: 80 MG/DL
HCT VFR BLD CALC: 40.2 %
HGB BLD-MCNC: 13.3 G/DL
IMM GRANULOCYTES NFR BLD AUTO: 0.2 %
LYMPHOCYTES # BLD AUTO: 2.12 K/UL
LYMPHOCYTES NFR BLD AUTO: 37.7 %
MAN DIFF?: NORMAL
MCHC RBC-ENTMCNC: 32 PG
MCHC RBC-ENTMCNC: 33.1 GM/DL
MCV RBC AUTO: 96.6 FL
MONOCYTES # BLD AUTO: 0.61 K/UL
MONOCYTES NFR BLD AUTO: 10.8 %
NEUTROPHILS # BLD AUTO: 2.81 K/UL
NEUTROPHILS NFR BLD AUTO: 49.8 %
PLATELET # BLD AUTO: 357 K/UL
POTASSIUM SERPL-SCNC: 3.9 MMOL/L
PROT SERPL-MCNC: 7.9 G/DL
RBC # BLD: 4.16 M/UL
RBC # FLD: 12.5 %
SODIUM SERPL-SCNC: 136 MMOL/L
WBC # FLD AUTO: 5.63 K/UL

## 2018-03-14 ENCOUNTER — TRANSCRIPTION ENCOUNTER (OUTPATIENT)
Age: 23
End: 2018-03-14

## 2019-01-18 ENCOUNTER — TRANSCRIPTION ENCOUNTER (OUTPATIENT)
Age: 24
End: 2019-01-18

## 2022-08-19 NOTE — PROGRESS NOTE ADULT - ASSESSMENT
Left voicemail medication is being refilled.   22yo F with PMH of Migraines (attacks 2-3 x year, respond to excedrin) presenting HA and fever x1 day. Patient with HA, neck stiffness, photophobia. CSF positive with high cells, mixed neutrophils/lymphs. Protein/glucose not particularly abnormal. Initial culture now with GPR. Notably CSF PCR is negative (which includes listeria). Uncertain significance, but agree with adding ampicillin. Notably, HSV PCR negative. Patient afebrile this AM, but had worsening in the early AM.  - Ceftriaxone 2g q 12  - Vancomycin 1g q 12  - Add Amp 2g q 4  - DC acyclovir  - F/U culture  - F/U pending LP tests      Mika Manuel MD  Pager [phone]  After 5pm and on weekends call [phone] 22yo F with PMH of Migraines (attacks 2-3 x year, respond to excedrin) presenting HA and fever x1 day. Patient with HA, neck stiffness, photophobia. CSF positive with high cells, mixed neutrophils/lymphs. Protein/glucose not particularly abnormal. Initial culture now with GPR. Notably CSF PCR is negative (which includes listeria). Uncertain significance, but agree with adding ampicillin. Notably, HSV PCR negative. Patient afebrile this AM, but had worsening in the early AM. Continue other abx for now--for possibility of overstained, abnormal morphology neisseria or S pne.  - Ceftriaxone 2g q 12  - Vancomycin 1g q 12  - Add Amp 2g q 4  - DC acyclovir  - F/U culture  - F/U pending LP tests      Mika Manuel MD  Pager [phone]  After 5pm and on weekends call [phone]

## 2022-09-16 ENCOUNTER — NON-APPOINTMENT (OUTPATIENT)
Age: 27
End: 2022-09-16
